# Patient Record
Sex: FEMALE | Race: WHITE | NOT HISPANIC OR LATINO | Employment: OTHER | URBAN - NONMETROPOLITAN AREA
[De-identification: names, ages, dates, MRNs, and addresses within clinical notes are randomized per-mention and may not be internally consistent; named-entity substitution may affect disease eponyms.]

---

## 2023-01-29 PROBLEM — R26.2 DIFFICULTY WALKING: Status: ACTIVE | Noted: 2023-01-29

## 2023-01-29 PROBLEM — N13.30 HYDRONEPHROSIS, RIGHT: Status: ACTIVE | Noted: 2023-01-29

## 2023-01-29 PROBLEM — N20.0 KIDNEY STONE ON RIGHT SIDE: Status: ACTIVE | Noted: 2023-01-29

## 2023-01-29 PROBLEM — N32.9 LESION OF URINARY BLADDER: Status: ACTIVE | Noted: 2023-01-29

## 2023-01-29 PROBLEM — E78.00 HYPERCHOLESTEREMIA: Status: ACTIVE | Noted: 2023-01-29

## 2023-01-29 PROBLEM — R31.9 HEMATURIA: Status: ACTIVE | Noted: 2023-01-29

## 2023-01-29 PROBLEM — R32 URINARY INCONTINENCE: Status: ACTIVE | Noted: 2023-01-29

## 2023-01-29 PROBLEM — R29.898 LEG WEAKNESS: Status: ACTIVE | Noted: 2023-01-29

## 2023-01-29 PROBLEM — I10 HYPERTENSION: Status: ACTIVE | Noted: 2023-01-29

## 2023-01-29 PROBLEM — K60.3 ANAL FISTULA: Status: ACTIVE | Noted: 2023-01-29

## 2023-01-29 PROBLEM — N39.0 UTI (URINARY TRACT INFECTION): Status: ACTIVE | Noted: 2023-01-29

## 2023-01-29 PROBLEM — K60.30 ANAL FISTULA: Status: ACTIVE | Noted: 2023-01-29

## 2023-01-29 RX ORDER — MIRABEGRON 50 MG/1
1 TABLET, EXTENDED RELEASE ORAL DAILY
COMMUNITY
Start: 2021-05-26 | End: 2023-03-13 | Stop reason: ALTCHOICE

## 2023-01-29 RX ORDER — TRAMADOL HYDROCHLORIDE 50 MG/1
1 TABLET ORAL EVERY 6 HOURS PRN
COMMUNITY
Start: 2021-04-19 | End: 2023-03-13 | Stop reason: ALTCHOICE

## 2023-01-29 RX ORDER — METOPROLOL TARTRATE 50 MG/1
TABLET ORAL
COMMUNITY
End: 2023-03-13 | Stop reason: ALTCHOICE

## 2023-01-29 RX ORDER — ARIPIPRAZOLE 10 MG/1
1 TABLET ORAL DAILY
COMMUNITY
Start: 2020-11-18 | End: 2023-03-13 | Stop reason: SDUPTHER

## 2023-01-29 RX ORDER — LITHIUM CARBONATE 450 MG/1
1 TABLET ORAL DAILY
COMMUNITY
Start: 2020-11-18 | End: 2023-03-13 | Stop reason: ALTCHOICE

## 2023-01-29 RX ORDER — AMMONIUM LACTATE 12 G/100G
LOTION TOPICAL
COMMUNITY
End: 2023-03-13 | Stop reason: ALTCHOICE

## 2023-01-29 RX ORDER — ATORVASTATIN CALCIUM 40 MG/1
1 TABLET, FILM COATED ORAL DAILY
COMMUNITY
Start: 2020-11-18 | End: 2023-03-13 | Stop reason: ALTCHOICE

## 2023-03-10 NOTE — PROGRESS NOTES
This is a 71yo female here to establish care:    Chief complain:     Urinary and stool incontinence; just finished abx post surgery; b/l leg pain/weakness- no falls in last 6 months; open sore on butt     Nephrolithiasis, hydronephrosis, h/o urinary inctontinence surgery:    Leg weakness, leg weakness: She is haaving leg weakness and bladder incontinence. She is having pain up and down her legs. She was on diclofenac but it was discontinued after her kidney surgery last week. She had surgery to remove kidney stones. She had surgery for kidney stones. Surgery was with Dr. Rivera at [place]. Leg pain and weakness has been going on about 2 years. She has tried osteobiflex and chiropractor treatment as well as physical therapy. Using the wheelchair periodically for a while now, unable to say how long. She uses a walker at home. Has not tried gabapentin for pain. She did physical therapy a couple weeks ago (Home PT). Not having much pain in her back, her pain is concentrated in her knees and hips. She had an injection in her hip once and it helped for a while ([facility] in [place]). She is in an assisted living facility here in [place]. She has a sore on her buttocks that she wants checked.    Bipolar disorder: She is on abilify and lexapro for years. Feels these are working reasonably well.     HTN: She is on metoprolol,unsure what she is taking this for, states that she has not been diagnosed with HTN.    Review of systems completed and unremarkable other than what is documented in HPI.    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  Medical history:  Medications: abilify, lexapro, metoprolol  SurgHx: 3 kidney surgery, 3 surgeries for anal fistula (last spring) (unsure who did these), hemorrhoid surgery, ear drum replacement, tonsillectomy, cholecystectomy  Fhx: canceron her father's side, dad had lung cancer, several uncles had lung cancer  as well  Allergies: NKDA    Gen: No acute distress. Alert and oriented x3.   HEENT: Normocephalic, atraumatic. PERRLA and EOMI, no conjunctival injection. B/L EAC are clear, TM's viewed are WNL. No rhinorrhea, no oropharyngeal lesions.  Neck: No lymphadenopathy, thyroid WNL.  CV: Regular rate and rhythm. Normal S1/S2.  Resp: CTAB/L. No wheezes or rhonchi appreciated.  Abdomen: Soft. Nontender. Nondistended. Bowel sounds normoactive. No guarding or rigidity.  Derm: Skin is warm and dry. No rashes appreciated or suspicious lesions noted.   Neuro: Cranial nerves intact. Normal gait.  Psych: Appropriate mood and affect. Normal speech and eye contact.   Extremities: No deformities appreciated. No severe edema.  MSK: She can stand but only xport in wheelchair, sacral decubitus ulcer noted    73yo female here to establish care:    #Urinary incontinence  I fear her sx are related to leg weakness, spinal disease  Check xray lumbar spine  She wants to trial a medication although she is established with urology  Rx sent oxybutynin    #Sacral decubitus ulcer  Work on offloading  Rx sent doughnut pillow    #Leg pain  She does have e/o OA but with leg weakness there is concern about spinal disease  Has not had benefit from PT  Cannot tolerate nsaids due to CKD and recent kidney surgery  Trial gabapentin  Referring to ortho for injections  Considering pain mgmt eval    #Bipolar disorder  Doing reasonably well on lexapro and abilify    #HTN  Questionable diagnosis  On metoprolol    #Nephrolithiasis  Doing reasonably well since surgery    HCM:  Declining PNA vaccines but she may consider

## 2023-03-13 ENCOUNTER — OFFICE VISIT (OUTPATIENT)
Dept: PRIMARY CARE | Facility: CLINIC | Age: 73
End: 2023-03-13
Payer: MEDICARE

## 2023-03-13 VITALS
DIASTOLIC BLOOD PRESSURE: 65 MMHG | HEIGHT: 62 IN | SYSTOLIC BLOOD PRESSURE: 102 MMHG | BODY MASS INDEX: 39.87 KG/M2 | HEART RATE: 71 BPM

## 2023-03-13 DIAGNOSIS — M25.552 BILATERAL HIP PAIN: ICD-10-CM

## 2023-03-13 DIAGNOSIS — M25.551 BILATERAL HIP PAIN: ICD-10-CM

## 2023-03-13 DIAGNOSIS — M25.562 CHRONIC PAIN OF BOTH KNEES: ICD-10-CM

## 2023-03-13 DIAGNOSIS — G89.29 CHRONIC PAIN OF BOTH KNEES: ICD-10-CM

## 2023-03-13 DIAGNOSIS — R29.898 LEG WEAKNESS, BILATERAL: ICD-10-CM

## 2023-03-13 DIAGNOSIS — R32 URINARY INCONTINENCE, UNSPECIFIED TYPE: ICD-10-CM

## 2023-03-13 DIAGNOSIS — M25.561 CHRONIC PAIN OF BOTH KNEES: ICD-10-CM

## 2023-03-13 DIAGNOSIS — F31.9 BIPOLAR AFFECTIVE DISORDER, REMISSION STATUS UNSPECIFIED (MULTI): Primary | ICD-10-CM

## 2023-03-13 PROCEDURE — 3074F SYST BP LT 130 MM HG: CPT | Performed by: FAMILY MEDICINE

## 2023-03-13 PROCEDURE — 1036F TOBACCO NON-USER: CPT | Performed by: FAMILY MEDICINE

## 2023-03-13 PROCEDURE — 1159F MED LIST DOCD IN RCRD: CPT | Performed by: FAMILY MEDICINE

## 2023-03-13 PROCEDURE — 3008F BODY MASS INDEX DOCD: CPT | Performed by: FAMILY MEDICINE

## 2023-03-13 PROCEDURE — 99204 OFFICE O/P NEW MOD 45 MIN: CPT | Performed by: FAMILY MEDICINE

## 2023-03-13 PROCEDURE — 3078F DIAST BP <80 MM HG: CPT | Performed by: FAMILY MEDICINE

## 2023-03-13 RX ORDER — GABAPENTIN 300 MG/1
300 CAPSULE ORAL 3 TIMES DAILY
Qty: 90 CAPSULE | Refills: 5 | Status: SHIPPED | OUTPATIENT
Start: 2023-03-13 | End: 2023-04-26

## 2023-03-13 RX ORDER — ESCITALOPRAM OXALATE 10 MG/1
1 TABLET ORAL DAILY
COMMUNITY
End: 2023-03-13 | Stop reason: SDUPTHER

## 2023-03-13 RX ORDER — ARIPIPRAZOLE 10 MG/1
10 TABLET ORAL DAILY
Qty: 90 TABLET | Refills: 3 | Status: SHIPPED | OUTPATIENT
Start: 2023-03-13 | End: 2024-05-15

## 2023-03-13 RX ORDER — METOPROLOL SUCCINATE 50 MG/1
50 TABLET, EXTENDED RELEASE ORAL DAILY
Qty: 90 TABLET | Refills: 3 | Status: SHIPPED | OUTPATIENT
Start: 2023-03-13 | End: 2023-10-31 | Stop reason: WASHOUT

## 2023-03-13 RX ORDER — METOPROLOL SUCCINATE 50 MG/1
1 TABLET, EXTENDED RELEASE ORAL DAILY
COMMUNITY
Start: 2023-02-11 | End: 2023-03-13 | Stop reason: SDUPTHER

## 2023-03-13 RX ORDER — OXYBUTYNIN CHLORIDE 15 MG/1
15 TABLET, EXTENDED RELEASE ORAL DAILY
Qty: 30 TABLET | Refills: 2 | Status: SHIPPED | OUTPATIENT
Start: 2023-03-13 | End: 2023-05-01

## 2023-03-13 RX ORDER — ESCITALOPRAM OXALATE 10 MG/1
10 TABLET ORAL DAILY
Qty: 90 TABLET | Refills: 3 | Status: SHIPPED | OUTPATIENT
Start: 2023-03-13 | End: 2023-06-05

## 2023-03-13 NOTE — PATIENT INSTRUCTIONS
Orthopedics:   Ted Zarate () [phone]  JOSE D Sadler [phone]  Bennie Bourgeois () [phone]  [facility] [phone]

## 2023-04-26 ENCOUNTER — OFFICE VISIT (OUTPATIENT)
Dept: PRIMARY CARE | Facility: CLINIC | Age: 73
End: 2023-04-26
Payer: MEDICARE

## 2023-04-26 VITALS — BODY MASS INDEX: 39.87 KG/M2 | DIASTOLIC BLOOD PRESSURE: 73 MMHG | HEIGHT: 62 IN | SYSTOLIC BLOOD PRESSURE: 116 MMHG

## 2023-04-26 DIAGNOSIS — N13.30 HYDRONEPHROSIS, RIGHT: ICD-10-CM

## 2023-04-26 DIAGNOSIS — R29.898 WEAKNESS OF BOTH LOWER EXTREMITIES: Primary | ICD-10-CM

## 2023-04-26 DIAGNOSIS — R19.7 DIARRHEA, UNSPECIFIED TYPE: ICD-10-CM

## 2023-04-26 PROCEDURE — 3074F SYST BP LT 130 MM HG: CPT | Performed by: REGISTERED NURSE

## 2023-04-26 PROCEDURE — 99213 OFFICE O/P EST LOW 20 MIN: CPT | Performed by: REGISTERED NURSE

## 2023-04-26 PROCEDURE — 1036F TOBACCO NON-USER: CPT | Performed by: REGISTERED NURSE

## 2023-04-26 PROCEDURE — 3008F BODY MASS INDEX DOCD: CPT | Performed by: REGISTERED NURSE

## 2023-04-26 PROCEDURE — 3078F DIAST BP <80 MM HG: CPT | Performed by: REGISTERED NURSE

## 2023-04-26 PROCEDURE — 1160F RVW MEDS BY RX/DR IN RCRD: CPT | Performed by: REGISTERED NURSE

## 2023-04-26 PROCEDURE — 1159F MED LIST DOCD IN RCRD: CPT | Performed by: REGISTERED NURSE

## 2023-04-26 RX ORDER — GABAPENTIN 400 MG/1
400 CAPSULE ORAL 3 TIMES DAILY
Qty: 90 CAPSULE | Refills: 11 | Status: SHIPPED | OUTPATIENT
Start: 2023-04-26 | End: 2024-04-25

## 2023-04-26 RX ORDER — ATORVASTATIN CALCIUM 40 MG/1
1 TABLET, FILM COATED ORAL DAILY
COMMUNITY
End: 2023-10-31 | Stop reason: WASHOUT

## 2023-04-26 RX ORDER — DICLOFENAC SODIUM 75 MG/1
TABLET, DELAYED RELEASE ORAL
COMMUNITY
Start: 2020-11-18 | End: 2023-10-31 | Stop reason: WASHOUT

## 2023-04-26 ASSESSMENT — ENCOUNTER SYMPTOMS
NAUSEA: 0
COUGH: 0
FREQUENCY: 0
WHEEZING: 0
FEVER: 0
DIARRHEA: 0
CONSTIPATION: 0
SHORTNESS OF BREATH: 0
DIZZINESS: 0
CONFUSION: 0
HEADACHES: 0
CHILLS: 0
WOUND: 0
EYE REDNESS: 0
WEAKNESS: 0
RHINORRHEA: 0
DIFFICULTY URINATING: 0
NERVOUS/ANXIOUS: 0
VOMITING: 0
EYE DISCHARGE: 0
ABDOMINAL PAIN: 0

## 2023-04-26 NOTE — PATIENT INSTRUCTIONS
Orthopedics:   Ted Zarate () [phone]  JOSE D Sadler [phone]  Bennie Bourgeois () [phone]  [facility] [phone]    Pain Management:   Bennie Coello and Mckayla Caruso () [phone]  Clinton Chand and Mio Ortega ([facility]) [phone]  Connie Page () [phone]  [facility] [phone]

## 2023-04-26 NOTE — PROGRESS NOTES
Subjective   Patient ID: Noemi Pérez is a 72 y.o. female who presents for Follow-up (Pt presents for a 6 wks follow up; she has had diarrhea for the last 3 days and once last week; she is also having trouble walking, knees feel like they are going to give out on her;).    HPI   Urinary and stool incontinence; just finished abx post surgery; b/l leg pain/weakness- no falls in last 6 months;     Nephrolithiasis, hydronephrosis, h/o urinary inctontinence surgery:     Leg weakness, leg weakness: She is haaving leg weakness and bladder incontinence. She is having pain up and down her legs. She was on diclofenac but it was discontinued after her kidney surgery last week. She had surgery to remove kidney stones. She had surgery for kidney stones. Surgery was with Dr. Rivera at [place]. Leg pain and weakness has been going on about 2 years. She has tried osteobiflex and chiropractor treatment as well as physical therapy. Using the wheelchair periodically for a while now, unable to say how long. She uses a walker at home. Has not tried gabapentin for pain. She did physical therapy a couple weeks ago (Home PT). Not having much pain in her back, her pain is concentrated in her knees and hips. She had an injection in her hip once and it helped for a while ([facility] in [place]). She is in an assisted living facility here in [place]. She has a sore on her buttocks that she wants checked.    Diarrhea: one last week, and then started 3 days ago, denies blood in stool.   Some improvement with Oxybutinin but having diarrhea now.   Declined sore on coccyx currently.   She was cold this morning, denies fever or chills.     Bipolar disorder: She is on abilify and lexapro for years. Feels these are working reasonably well.      HTN: She is on metoprolol,unsure what she is taking this for, states that she has not been diagnosed with HTN.     Review of systems completed and unremarkable other than what is documented in HPI.    Review  "of Systems   Constitutional:  Negative for chills and fever.   HENT:  Negative for congestion, ear pain and rhinorrhea.    Eyes:  Negative for discharge and redness.   Respiratory:  Negative for cough, shortness of breath and wheezing.    Cardiovascular:  Negative for chest pain and leg swelling.   Gastrointestinal:  Negative for abdominal pain, constipation, diarrhea, nausea and vomiting.   Genitourinary:  Negative for difficulty urinating, frequency and urgency.   Musculoskeletal:  Negative for gait problem.   Skin:  Negative for rash and wound.   Neurological:  Negative for dizziness, weakness and headaches.   Psychiatric/Behavioral:  Negative for confusion. The patient is not nervous/anxious.        Objective   /73 (BP Location: Right arm, Patient Position: Sitting, BP Cuff Size: Adult)   Ht 1.575 m (5' 2\")   BMI 39.87 kg/m²     Physical Exam  Vitals reviewed.   Constitutional:       Appearance: Normal appearance.   HENT:      Head: Normocephalic.      Right Ear: Tympanic membrane, ear canal and external ear normal.      Left Ear: Tympanic membrane, ear canal and external ear normal.      Nose: No rhinorrhea.      Mouth/Throat:      Mouth: Mucous membranes are moist.      Pharynx: Oropharynx is clear.   Eyes:      Pupils: Pupils are equal, round, and reactive to light.   Cardiovascular:      Rate and Rhythm: Normal rate and regular rhythm.      Pulses: Normal pulses.   Pulmonary:      Effort: Pulmonary effort is normal.      Breath sounds: Normal breath sounds.   Abdominal:      General: Abdomen is flat. Bowel sounds are normal.      Palpations: Abdomen is soft.   Musculoskeletal:         General: No tenderness. Normal range of motion.      Right lower leg: No edema.      Left lower leg: No edema.      Comments: In wheelchair    Lymphadenopathy:      Cervical: No cervical adenopathy.   Skin:     General: Skin is warm and dry.      Findings: No rash.   Neurological:      Mental Status: She is alert and " oriented to person, place, and time.   Psychiatric:         Mood and Affect: Mood normal.         Behavior: Behavior normal.       Assessment/Plan       #Diarrhea   Can be viral   If it persists use immodium otc      #Urinary incontinence  I fear her sx are related to leg weakness, spinal disease  Check xray lumbar spine  She wants to trial a medication although she is established with urology  Rx sent oxybutynin  Having some dry mouth      #Sacral decubitus ulcer  Work on offloading  Rx sent doughnut pillow     #Leg pain  She does have e/o OA but with leg weakness there is concern about spinal disease  Has not had benefit from PT  Cannot tolerate nsaids due to CKD and recent kidney surgery  Trial gabapentin  Increase to 400mg TID   Will check kidney function at follow up in 6 weeks   Referring to ortho for injections  Considering pain mgmt eval     #Bipolar disorder  Doing reasonably well on lexapro and abilify     #HTN  Questionable diagnosis  On metoprolol     #Nephrolithiasis  Doing reasonably well since surgery     HCM:  Declining PNA vaccines but she may consider

## 2023-05-01 DIAGNOSIS — R32 URINARY INCONTINENCE, UNSPECIFIED TYPE: ICD-10-CM

## 2023-05-01 RX ORDER — OXYBUTYNIN CHLORIDE 15 MG/1
TABLET, EXTENDED RELEASE ORAL
Qty: 30 TABLET | Refills: 1 | Status: SHIPPED | OUTPATIENT
Start: 2023-05-01

## 2023-05-01 RX ORDER — SULFAMETHOXAZOLE AND TRIMETHOPRIM 800; 160 MG/1; MG/1
TABLET ORAL
COMMUNITY
Start: 2023-04-29 | End: 2023-10-31 | Stop reason: WASHOUT

## 2023-05-04 ENCOUNTER — NURSING HOME VISIT (OUTPATIENT)
Dept: POST ACUTE CARE | Facility: EXTERNAL LOCATION | Age: 73
End: 2023-05-04
Payer: MEDICARE

## 2023-05-04 DIAGNOSIS — N32.81 OAB (OVERACTIVE BLADDER): ICD-10-CM

## 2023-05-04 DIAGNOSIS — N30.00 ACUTE CYSTITIS WITHOUT HEMATURIA: Primary | ICD-10-CM

## 2023-05-04 DIAGNOSIS — K59.00 CONSTIPATION, UNSPECIFIED CONSTIPATION TYPE: ICD-10-CM

## 2023-05-04 DIAGNOSIS — G62.9 NEUROPATHY: ICD-10-CM

## 2023-05-04 DIAGNOSIS — R26.2 DIFFICULTY WALKING: ICD-10-CM

## 2023-05-04 DIAGNOSIS — M16.0 OSTEOARTHRITIS OF BOTH HIPS, UNSPECIFIED OSTEOARTHRITIS TYPE: ICD-10-CM

## 2023-05-04 DIAGNOSIS — F32.A DEPRESSION, UNSPECIFIED DEPRESSION TYPE: ICD-10-CM

## 2023-05-04 DIAGNOSIS — N18.9 CHRONIC KIDNEY DISEASE, UNSPECIFIED CKD STAGE: ICD-10-CM

## 2023-05-04 PROCEDURE — 99309 SBSQ NF CARE MODERATE MDM 30: CPT | Performed by: NURSE PRACTITIONER

## 2023-05-04 NOTE — LETTER
Patient: Noemi Pérez  : 1950    Encounter Date: 2023    Patient ID: Noemi Pérez is a 72 y.o. female who is acute skilled care being seen and evaluated for multiple medical problems.  [id number]   1950    /72   Pulse 68   Temp 36.7 °C (98 °F)   Resp 16   Wt 79.8 kg (176 lb)   SpO2 97%   BMI 32.19 kg/m²     Assessment/Plan  Problem List Items Addressed This Visit          Nervous    Neuropathy     Gabapentin 300 mg by mouth TID             Digestive    Constipation     Colace 100 mg by mouth BID  MOM 30 ml by mouth daily as needed             Genitourinary    UTI (urinary tract infection) - Primary     Bactrim completed 2023  Afebrile  Denies dysuria          OAB (overactive bladder)     Oxybutynin ER 15 mg  by mouth daily          Chronic kidney disease     2023 BUN/Creat 16/1.57  2023 repeat BMP and CBC with diff             Musculoskeletal    Osteoarthritis of both hips     Acetaminophen 650 mg by mouth every 4 hrs as needed  Schedule apt with Dr Rueda (Ortho)- client request   H/O left hip steroid injections               Other    Difficulty walking     PT/OT         Depression     Lexapro 10 mg by mouth daily   Aripiprazole 10 mg by mouth daily               HPI: Client admitted at [facility] from 2023- 2023 with the final diagnosis of UTI, Weakness, inability to ambulate, and severe bilateral hip OA. Client was living at the [facility]. Client received Rocephin and discharged to this facility with oral Bactrim. Urine culture results- contaminated. PMH- CKD and HTN. Requesting Dr Rueda for OA follow up. Client denies HA, dizziness, or lightheadedness. Denies CP, SOB, Cough, or increased edema. RA. Denies abdominal pain, N/V, or diarrhea. States no BM x 3 days. Denies dysuria. Denies change in appetite. Denies insomnia. C/O chronic left hip and knee pain.     Review of Systems ROS as described in in HPI     Physical Exam  Constitutional:        Appearance: Normal appearance.      Comments: Resting in bed    HENT:      Head: Normocephalic.      Mouth/Throat:      Mouth: Mucous membranes are moist.   Cardiovascular:      Rate and Rhythm: Normal rate and regular rhythm.   Pulmonary:      Effort: Pulmonary effort is normal.      Breath sounds: Normal breath sounds.      Comments: RA  Abdominal:      General: Bowel sounds are normal.   Genitourinary:     Comments: Denies dysuria   Musculoskeletal:      Cervical back: Neck supple.      Right lower leg: Right lower leg edema: slight.      Left lower leg: Left lower leg edema: Slight.      Comments: OA bilateral hips  PT/OT   Skin:     General: Skin is warm and dry.   Neurological:      Mental Status: She is alert. Mental status is at baseline.   Psychiatric:         Mood and Affect: Mood normal.      Comments: Conversational                    Electronically Signed By: HALEIGH Mcgovern   5/6/23  8:34 AM

## 2023-05-06 VITALS
BODY MASS INDEX: 32.19 KG/M2 | HEART RATE: 68 BPM | SYSTOLIC BLOOD PRESSURE: 138 MMHG | RESPIRATION RATE: 16 BRPM | DIASTOLIC BLOOD PRESSURE: 72 MMHG | OXYGEN SATURATION: 97 % | TEMPERATURE: 98 F | WEIGHT: 176 LBS

## 2023-05-06 PROBLEM — F32.A DEPRESSION: Status: ACTIVE | Noted: 2023-05-06

## 2023-05-06 PROBLEM — N18.9 CHRONIC KIDNEY DISEASE: Status: ACTIVE | Noted: 2023-05-06

## 2023-05-06 PROBLEM — G62.9 NEUROPATHY: Status: ACTIVE | Noted: 2023-05-06

## 2023-05-06 PROBLEM — M16.0 OSTEOARTHRITIS OF BOTH HIPS: Status: ACTIVE | Noted: 2023-05-06

## 2023-05-06 PROBLEM — N32.81 OAB (OVERACTIVE BLADDER): Status: ACTIVE | Noted: 2023-05-06

## 2023-05-06 PROBLEM — K59.00 CONSTIPATION: Status: ACTIVE | Noted: 2023-05-06

## 2023-05-06 NOTE — ASSESSMENT & PLAN NOTE
Acetaminophen 650 mg by mouth every 4 hrs as needed  Schedule apt with Dr Rueda (Ortho)- client request   H/O left hip steroid injections

## 2023-05-06 NOTE — PROGRESS NOTES
Patient ID: Noemi Pérez is a 72 y.o. female who is acute skilled care being seen and evaluated for multiple medical problems.  [id number]   1950    /72   Pulse 68   Temp 36.7 °C (98 °F)   Resp 16   Wt 79.8 kg (176 lb)   SpO2 97%   BMI 32.19 kg/m²     Assessment/Plan  Problem List Items Addressed This Visit          Nervous    Neuropathy     Gabapentin 300 mg by mouth TID             Digestive    Constipation     Colace 100 mg by mouth BID  MOM 30 ml by mouth daily as needed             Genitourinary    UTI (urinary tract infection) - Primary     Bactrim completed 5/2/2023  Afebrile  Denies dysuria          OAB (overactive bladder)     Oxybutynin ER 15 mg  by mouth daily          Chronic kidney disease     4/26/2023 BUN/Creat 16/1.57  5/8/2023 repeat BMP and CBC with diff             Musculoskeletal    Osteoarthritis of both hips     Acetaminophen 650 mg by mouth every 4 hrs as needed  Schedule apt with Dr Rueda (Ortho)- client request   H/O left hip steroid injections               Other    Difficulty walking     PT/OT         Depression     Lexapro 10 mg by mouth daily   Aripiprazole 10 mg by mouth daily               HPI: Client admitted at [facility] from 4/26/2023- 4/29/2023 with the final diagnosis of UTI, Weakness, inability to ambulate, and severe bilateral hip OA. Client was living at the [facility]. Client received Rocephin and discharged to this facility with oral Bactrim. Urine culture results- contaminated. PMH- CKD and HTN. Requesting Dr Rueda for OA follow up. Client denies HA, dizziness, or lightheadedness. Denies CP, SOB, Cough, or increased edema. RA. Denies abdominal pain, N/V, or diarrhea. States no BM x 3 days. Denies dysuria. Denies change in appetite. Denies insomnia. C/O chronic left hip and knee pain.     Review of Systems ROS as described in in HPI     Physical Exam  Constitutional:       Appearance: Normal appearance.      Comments: Resting in bed    HENT:       Head: Normocephalic.      Mouth/Throat:      Mouth: Mucous membranes are moist.   Cardiovascular:      Rate and Rhythm: Normal rate and regular rhythm.   Pulmonary:      Effort: Pulmonary effort is normal.      Breath sounds: Normal breath sounds.      Comments: RA  Abdominal:      General: Bowel sounds are normal.   Genitourinary:     Comments: Denies dysuria   Musculoskeletal:      Cervical back: Neck supple.      Right lower leg: Right lower leg edema: slight.      Left lower leg: Left lower leg edema: Slight.      Comments: OA bilateral hips  PT/OT   Skin:     General: Skin is warm and dry.   Neurological:      Mental Status: She is alert. Mental status is at baseline.   Psychiatric:         Mood and Affect: Mood normal.      Comments: Conversational

## 2023-05-11 ENCOUNTER — NURSING HOME VISIT (OUTPATIENT)
Dept: POST ACUTE CARE | Facility: EXTERNAL LOCATION | Age: 73
End: 2023-05-11
Payer: MEDICARE

## 2023-05-11 DIAGNOSIS — K59.00 CONSTIPATION, UNSPECIFIED CONSTIPATION TYPE: ICD-10-CM

## 2023-05-11 DIAGNOSIS — R53.81 PHYSICAL DECONDITIONING: Primary | ICD-10-CM

## 2023-05-11 DIAGNOSIS — M16.0 OSTEOARTHRITIS OF BOTH HIPS, UNSPECIFIED OSTEOARTHRITIS TYPE: ICD-10-CM

## 2023-05-11 DIAGNOSIS — F32.A DEPRESSION, UNSPECIFIED DEPRESSION TYPE: ICD-10-CM

## 2023-05-11 PROCEDURE — 99308 SBSQ NF CARE LOW MDM 20: CPT | Performed by: NURSE PRACTITIONER

## 2023-05-11 NOTE — LETTER
Patient: Noemi Pérez  : 1950    Encounter Date: 2023    Patient ID: Noemi Pérez is a 72 y.o. female who is acute skilled care being seen and evaluated for multiple medical problems.  [id number]   1950    /74   Pulse 81   Temp 36.6 °C (97.8 °F)   Resp 16   Wt 79.9 kg (176 lb 3.2 oz)   SpO2 96%   BMI 32.23 kg/m²     Assessment/Plan  Problem List Items Addressed This Visit          Digestive    Constipation     Colace 100 mg by mouth BID  MOM 30 ml by mouth daily as needed              Musculoskeletal    Osteoarthritis of both hips     Acetaminophen 650 mg by mouth every 4 hrs as needed  Apt with Dr Rueda (Ortho)- 2023   H/O left hip steroid injections                Other    Depression     Lexapro 10 mg by mouth daily   Aripiprazole 10 mg by mouth daily          Physical deconditioning - Primary     PT              HPI: Client continues to receive PT services. Pending apt with Dr Rueda 2023 for bilateral hip OA and left knee pain. Client denies HA, dizziness, or lightheadedness. Denies CP, SOB, Cough, or increased edema. RA. Denies abdominal pain, N/V, diarrhea, or constipation. Denies dysuria. Denies change in appetite. Denies insomnia.     Review of Systems ROS as described in in HPI     Physical Exam  Constitutional:       Appearance: Normal appearance.      Comments: Sitting in WC   HENT:      Head: Normocephalic.      Mouth/Throat:      Mouth: Mucous membranes are moist.   Cardiovascular:      Rate and Rhythm: Normal rate and regular rhythm.   Pulmonary:      Effort: Pulmonary effort is normal.      Breath sounds: Normal breath sounds.      Comments: RA  Abdominal:      General: Bowel sounds are normal.   Genitourinary:     Comments: Denies dysuria   Musculoskeletal:      Cervical back: Neck supple.      Comments: Bilateral hip and left knee pain  OA  PT   Skin:     General: Skin is warm and dry.   Neurological:      Mental Status: She is alert. Mental status is  at baseline.   Psychiatric:         Mood and Affect: Mood normal.      Comments: Conversational                    Electronically Signed By: HALEIGH Mcgovern   5/14/23  7:08 AM

## 2023-05-14 VITALS
BODY MASS INDEX: 32.23 KG/M2 | SYSTOLIC BLOOD PRESSURE: 125 MMHG | TEMPERATURE: 97.8 F | WEIGHT: 176.2 LBS | OXYGEN SATURATION: 96 % | HEART RATE: 81 BPM | RESPIRATION RATE: 16 BRPM | DIASTOLIC BLOOD PRESSURE: 74 MMHG

## 2023-05-14 PROBLEM — R53.81 PHYSICAL DECONDITIONING: Status: ACTIVE | Noted: 2023-05-14

## 2023-05-14 NOTE — PROGRESS NOTES
Patient ID: Noemi Pérez is a 72 y.o. female who is acute skilled care being seen and evaluated for multiple medical problems.  [id number]   1950    /74   Pulse 81   Temp 36.6 °C (97.8 °F)   Resp 16   Wt 79.9 kg (176 lb 3.2 oz)   SpO2 96%   BMI 32.23 kg/m²     Assessment/Plan  Problem List Items Addressed This Visit          Digestive    Constipation     Colace 100 mg by mouth BID  MOM 30 ml by mouth daily as needed              Musculoskeletal    Osteoarthritis of both hips     Acetaminophen 650 mg by mouth every 4 hrs as needed  Apt with Dr Rueda (Ortho)- 5/23/2023   H/O left hip steroid injections                Other    Depression     Lexapro 10 mg by mouth daily   Aripiprazole 10 mg by mouth daily          Physical deconditioning - Primary     PT              HPI: Client continues to receive PT services. Pending apt with Dr Rueda 5/23/2023 for bilateral hip OA and left knee pain. Client denies HA, dizziness, or lightheadedness. Denies CP, SOB, Cough, or increased edema. RA. Denies abdominal pain, N/V, diarrhea, or constipation. Denies dysuria. Denies change in appetite. Denies insomnia.     Review of Systems ROS as described in in HPI     Physical Exam  Constitutional:       Appearance: Normal appearance.      Comments: Sitting in WC   HENT:      Head: Normocephalic.      Mouth/Throat:      Mouth: Mucous membranes are moist.   Cardiovascular:      Rate and Rhythm: Normal rate and regular rhythm.   Pulmonary:      Effort: Pulmonary effort is normal.      Breath sounds: Normal breath sounds.      Comments: RA  Abdominal:      General: Bowel sounds are normal.   Genitourinary:     Comments: Denies dysuria   Musculoskeletal:      Cervical back: Neck supple.      Comments: Bilateral hip and left knee pain  OA  PT   Skin:     General: Skin is warm and dry.   Neurological:      Mental Status: She is alert. Mental status is at baseline.   Psychiatric:         Mood and Affect: Mood normal.       Comments: Conversational

## 2023-05-14 NOTE — ASSESSMENT & PLAN NOTE
Acetaminophen 650 mg by mouth every 4 hrs as needed  Apt with Dr Rueda (Ortho)- 5/23/2023   H/O left hip steroid injections

## 2023-05-18 ENCOUNTER — NURSING HOME VISIT (OUTPATIENT)
Dept: POST ACUTE CARE | Facility: EXTERNAL LOCATION | Age: 73
End: 2023-05-18
Payer: MEDICARE

## 2023-05-18 DIAGNOSIS — G62.9 NEUROPATHY: ICD-10-CM

## 2023-05-18 DIAGNOSIS — M16.0 OSTEOARTHRITIS OF BOTH HIPS, UNSPECIFIED OSTEOARTHRITIS TYPE: ICD-10-CM

## 2023-05-18 DIAGNOSIS — R53.81 PHYSICAL DECONDITIONING: Primary | ICD-10-CM

## 2023-05-18 DIAGNOSIS — K59.00 CONSTIPATION, UNSPECIFIED CONSTIPATION TYPE: ICD-10-CM

## 2023-05-18 PROCEDURE — 99308 SBSQ NF CARE LOW MDM 20: CPT | Performed by: NURSE PRACTITIONER

## 2023-05-18 NOTE — LETTER
Patient: Noemi Pérez  : 1950    Encounter Date: 2023    Patient ID: Noemi Pérez is a 72 y.o. female who is acute skilled care being seen and evaluated for multiple medical problems.  [id number]   1950    /72   Pulse 74   Temp 36.6 °C (97.8 °F)   Resp 17   Wt 79.8 kg (176 lb)   SpO2 95%   BMI 32.19 kg/m²     Assessment/Plan  Problem List Items Addressed This Visit          Nervous    Neuropathy     Gabapentin 300 mg by mouth TID             Digestive    Constipation     Colace 100 mg by mouth BID  MOM 30 ml by mouth daily as needed                 Musculoskeletal    Osteoarthritis of both hips     Acetaminophen 650 mg by mouth every 4 hrs as needed  Apt with Dr Rueda (Ortho)- 2023   H/O left hip steroid injections            Other    Physical deconditioning - Primary     PT              HPI: Client continues to receive PT services. Client denies HA, dizziness, or lightheadedness. Denies CP, SOB, Cough, or increased edema. Denies abdominal pain, N/V, diarrhea, or constipation. Denies dysuria or hematuria. Denies change in appetite. Denies insomnia. Denies current pain.  C/O Bilateral hip and left knee pain when up in WC.     Review of Systems ROS as described in in HPI     Physical Exam  Constitutional:       Appearance: Normal appearance.      Comments: Resting in bed    HENT:      Head: Normocephalic.      Mouth/Throat:      Mouth: Mucous membranes are moist.   Cardiovascular:      Rate and Rhythm: Normal rate and regular rhythm.   Pulmonary:      Effort: Pulmonary effort is normal.      Breath sounds: Normal breath sounds.      Comments: RA  Abdominal:      General: Bowel sounds are normal.      Palpations: Abdomen is soft.   Musculoskeletal:      Cervical back: Neck supple.      Comments: PT/OT  Bilateral hip and left knee pain    Skin:     General: Skin is warm and dry.   Neurological:      Mental Status: She is alert. Mental status is at baseline.   Psychiatric:          Mood and Affect: Mood normal.                   Electronically Signed By: HALEIGH Mcgovern   5/19/23  4:10 PM

## 2023-05-19 VITALS
TEMPERATURE: 97.8 F | OXYGEN SATURATION: 95 % | BODY MASS INDEX: 32.19 KG/M2 | HEART RATE: 74 BPM | DIASTOLIC BLOOD PRESSURE: 72 MMHG | SYSTOLIC BLOOD PRESSURE: 119 MMHG | RESPIRATION RATE: 17 BRPM | WEIGHT: 176 LBS

## 2023-05-19 NOTE — PROGRESS NOTES
Patient ID: Noemi Pérez is a 72 y.o. female who is acute skilled care being seen and evaluated for multiple medical problems.  [id number]   1950    /72   Pulse 74   Temp 36.6 °C (97.8 °F)   Resp 17   Wt 79.8 kg (176 lb)   SpO2 95%   BMI 32.19 kg/m²     Assessment/Plan  Problem List Items Addressed This Visit          Nervous    Neuropathy     Gabapentin 300 mg by mouth TID             Digestive    Constipation     Colace 100 mg by mouth BID  MOM 30 ml by mouth daily as needed                 Musculoskeletal    Osteoarthritis of both hips     Acetaminophen 650 mg by mouth every 4 hrs as needed  Apt with Dr Rueda (Ortho)- 5/23/2023   H/O left hip steroid injections            Other    Physical deconditioning - Primary     PT              HPI: Client continues to receive PT services. Client denies HA, dizziness, or lightheadedness. Denies CP, SOB, Cough, or increased edema. Denies abdominal pain, N/V, diarrhea, or constipation. Denies dysuria or hematuria. Denies change in appetite. Denies insomnia. Denies current pain.  C/O Bilateral hip and left knee pain when up in WC.     Review of Systems ROS as described in in HPI     Physical Exam  Constitutional:       Appearance: Normal appearance.      Comments: Resting in bed    HENT:      Head: Normocephalic.      Mouth/Throat:      Mouth: Mucous membranes are moist.   Cardiovascular:      Rate and Rhythm: Normal rate and regular rhythm.   Pulmonary:      Effort: Pulmonary effort is normal.      Breath sounds: Normal breath sounds.      Comments: RA  Abdominal:      General: Bowel sounds are normal.      Palpations: Abdomen is soft.   Musculoskeletal:      Cervical back: Neck supple.      Comments: PT/OT  Bilateral hip and left knee pain    Skin:     General: Skin is warm and dry.   Neurological:      Mental Status: She is alert. Mental status is at baseline.   Psychiatric:         Mood and Affect: Mood normal.

## 2023-05-25 ENCOUNTER — NURSING HOME VISIT (OUTPATIENT)
Dept: POST ACUTE CARE | Facility: EXTERNAL LOCATION | Age: 73
End: 2023-05-25
Payer: MEDICARE

## 2023-05-25 DIAGNOSIS — G62.9 NEUROPATHY: ICD-10-CM

## 2023-05-25 DIAGNOSIS — N18.9 CHRONIC KIDNEY DISEASE, UNSPECIFIED CKD STAGE: ICD-10-CM

## 2023-05-25 DIAGNOSIS — M16.0 OSTEOARTHRITIS OF BOTH HIPS, UNSPECIFIED OSTEOARTHRITIS TYPE: ICD-10-CM

## 2023-05-25 DIAGNOSIS — R53.81 PHYSICAL DECONDITIONING: Primary | ICD-10-CM

## 2023-05-25 PROCEDURE — 99308 SBSQ NF CARE LOW MDM 20: CPT | Performed by: NURSE PRACTITIONER

## 2023-05-25 NOTE — LETTER
Patient: Noemi Pérez  : 1950    Encounter Date: 2023    Patient ID: Noemi Pérez is a 72 y.o. female who is acute skilled care being seen and evaluated for multiple medical problems.  [id number]   1950    /67   Pulse 72   Temp 36.1 °C (97 °F)   Resp 16   Wt 79.8 kg (176 lb)   SpO2 97%   BMI 32.19 kg/m²     Assessment/Plan  Problem List Items Addressed This Visit          Nervous    Neuropathy     Gabapentin 300 mg by mouth TID              Genitourinary    Chronic kidney disease     2023 BUN/Creat 16/1.57             Musculoskeletal    Osteoarthritis of both hips     Acetaminophen 650 mg by mouth every 4 hrs as needed  Apt with Dr Rueda (Ortho)- 2023 completed   Tentative Left Total Hip Arthroplasty 2023  Medical Clearance- CMP, CBC with diff, EKG              Other    Physical deconditioning - Primary     PT              HPI: Client pending left total hip arthroplasty per Dr Rueda 2023. Client denies HA, dizziness, or lightheadedness. Denies CP, SOB, Cough, or increased edema. RA. Denies abdominal pain, N/V, diarrhea, or constipation. Denies dysuria. Denies change in appetite. C/O chronic left hip and left knee pain.       Review of Systems ROS as described in in HPI     Physical Exam  Constitutional:       Appearance: Normal appearance.      Comments: Resting in bed    HENT:      Head: Normocephalic.      Mouth/Throat:      Mouth: Mucous membranes are moist.   Cardiovascular:      Rate and Rhythm: Normal rate and regular rhythm.   Pulmonary:      Effort: Pulmonary effort is normal.      Breath sounds: Normal breath sounds.      Comments: RA  Abdominal:      General: Bowel sounds are normal.      Palpations: Abdomen is soft.   Genitourinary:     Comments: Denies dysuria   Musculoskeletal:      Cervical back: Neck supple.      Comments: Chronic left hip and knee pain    Skin:     General: Skin is warm and dry.   Neurological:      Mental Status: She is  alert and oriented to person, place, and time. Mental status is at baseline.   Psychiatric:         Mood and Affect: Mood normal.                   Electronically Signed By: HALEIGH Mcgovern   5/28/23  7:52 AM

## 2023-05-28 VITALS
DIASTOLIC BLOOD PRESSURE: 67 MMHG | BODY MASS INDEX: 32.19 KG/M2 | WEIGHT: 176 LBS | RESPIRATION RATE: 16 BRPM | HEART RATE: 72 BPM | OXYGEN SATURATION: 97 % | TEMPERATURE: 97 F | SYSTOLIC BLOOD PRESSURE: 123 MMHG

## 2023-05-28 PROBLEM — M16.0 BILATERAL HIP JOINT ARTHRITIS: Status: ACTIVE | Noted: 2023-05-28

## 2023-05-28 NOTE — PROGRESS NOTES
Patient ID: Noemi Pérez is a 72 y.o. female who is acute skilled care being seen and evaluated for multiple medical problems.  [id number]   1950    /67   Pulse 72   Temp 36.1 °C (97 °F)   Resp 16   Wt 79.8 kg (176 lb)   SpO2 97%   BMI 32.19 kg/m²     Assessment/Plan  Problem List Items Addressed This Visit          Nervous    Neuropathy     Gabapentin 300 mg by mouth TID              Genitourinary    Chronic kidney disease     4/26/2023 BUN/Creat 16/1.57             Musculoskeletal    Osteoarthritis of both hips     Acetaminophen 650 mg by mouth every 4 hrs as needed  Apt with Dr Rueda (Ortho)- 5/23/2023 completed   Tentative Left Total Hip Arthroplasty 7/31/2023  Medical Clearance- CMP, CBC with diff, EKG              Other    Physical deconditioning - Primary     PT              HPI: Client pending left total hip arthroplasty per Dr Rueda 7/31/2023. Client denies HA, dizziness, or lightheadedness. Denies CP, SOB, Cough, or increased edema. RA. Denies abdominal pain, N/V, diarrhea, or constipation. Denies dysuria. Denies change in appetite. C/O chronic left hip and left knee pain.       Review of Systems ROS as described in in HPI     Physical Exam  Constitutional:       Appearance: Normal appearance.      Comments: Resting in bed    HENT:      Head: Normocephalic.      Mouth/Throat:      Mouth: Mucous membranes are moist.   Cardiovascular:      Rate and Rhythm: Normal rate and regular rhythm.   Pulmonary:      Effort: Pulmonary effort is normal.      Breath sounds: Normal breath sounds.      Comments: RA  Abdominal:      General: Bowel sounds are normal.      Palpations: Abdomen is soft.   Genitourinary:     Comments: Denies dysuria   Musculoskeletal:      Cervical back: Neck supple.      Comments: Chronic left hip and knee pain    Skin:     General: Skin is warm and dry.   Neurological:      Mental Status: She is alert and oriented to person, place, and time. Mental status is at baseline.    Psychiatric:         Mood and Affect: Mood normal.

## 2023-05-28 NOTE — ASSESSMENT & PLAN NOTE
Acetaminophen 650 mg by mouth every 4 hrs as needed  Apt with Dr Rueda (Ortho)- 5/23/2023 completed   Tentative Left Total Hip Arthroplasty 7/31/2023  Medical Clearance- CMP, CBC with diff, EKG

## 2023-06-01 ENCOUNTER — NURSING HOME VISIT (OUTPATIENT)
Dept: POST ACUTE CARE | Facility: EXTERNAL LOCATION | Age: 73
End: 2023-06-01
Payer: MEDICARE

## 2023-06-01 DIAGNOSIS — E78.5 HYPERLIPIDEMIA, UNSPECIFIED HYPERLIPIDEMIA TYPE: ICD-10-CM

## 2023-06-01 DIAGNOSIS — D64.9 ANEMIA, UNSPECIFIED TYPE: ICD-10-CM

## 2023-06-01 DIAGNOSIS — G62.9 NEUROPATHY: Primary | ICD-10-CM

## 2023-06-01 DIAGNOSIS — M16.0 OSTEOARTHRITIS OF BOTH HIPS, UNSPECIFIED OSTEOARTHRITIS TYPE: ICD-10-CM

## 2023-06-01 PROCEDURE — 99308 SBSQ NF CARE LOW MDM 20: CPT | Performed by: NURSE PRACTITIONER

## 2023-06-01 NOTE — LETTER
Patient: Noemi Pérez  : 1950    Encounter Date: 2023    Patient ID: Noemi Pérez is a 72 y.o. female who is long term resident being seen and evaluated for multiple medical problems.  [id number]   1950    /76   Pulse 70   Temp 36.6 °C (97.8 °F)   Resp 16   Wt 80.7 kg (178 lb)   SpO2 96%   BMI 32.56 kg/m²     Assessment/Plan  Problem List Items Addressed This Visit          Nervous    Neuropathy - Primary     Gabapentin 300 mg by mouth TID               Musculoskeletal    Osteoarthritis of both hips     Acetaminophen 650 mg by mouth every 4 hrs as needed  Apt with Dr Rueda (Ortho)- 2023 completed   Tentative Left Total Hip Arthroplasty 2023  Medical Clearance- CMP, CBC with diff, EKG completed   H/H 9..  BUN/CREAT .2            Hematologic    Anemia     H/H 9..  Iron, TIBC, Folate, B12 x 1            Other    Hyperlipidemia     2023 C 222, T 215, HDL 23,   Add Pravastatin 10 mg by mouth every HS               HPI: Pravastatin for HLD. Anemia panel pending, Hgb 9.8. Client denies HA, dizziness, or lightheadedness. Denies CP, SOB, Cough, or increased edema. Denies abdominal pain, N/V, diarrhea, or constipation. Denies dysuria. Denies change in appetite. Denies insomnia. C/O chronic left hip and knee pain.     Review of Systems ROS as described in in HPI     Physical Exam  Constitutional:       Appearance: Normal appearance.      Comments: Sitting in WC   HENT:      Head: Normocephalic.      Mouth/Throat:      Mouth: Mucous membranes are moist.   Cardiovascular:      Rate and Rhythm: Normal rate and regular rhythm.      Comments: EKG- NSR  Pulmonary:      Effort: Pulmonary effort is normal.      Breath sounds: Normal breath sounds.      Comments: RA  Abdominal:      General: Bowel sounds are normal.   Genitourinary:     Comments: Denies dysuria   Musculoskeletal:      Cervical back: Neck supple.      Comments: Chronic left hip and knee pain  WC     Skin:     General: Skin is warm and dry.   Neurological:      Mental Status: She is alert. Mental status is at baseline.   Psychiatric:         Mood and Affect: Mood normal.                   Electronically Signed By: HALEIGH Mcgovern   6/3/23  6:52 AM

## 2023-06-03 VITALS
SYSTOLIC BLOOD PRESSURE: 127 MMHG | WEIGHT: 178 LBS | DIASTOLIC BLOOD PRESSURE: 76 MMHG | HEART RATE: 70 BPM | RESPIRATION RATE: 16 BRPM | OXYGEN SATURATION: 96 % | TEMPERATURE: 97.8 F | BODY MASS INDEX: 32.56 KG/M2

## 2023-06-03 PROBLEM — D64.9 ANEMIA: Status: ACTIVE | Noted: 2023-06-03

## 2023-06-03 PROBLEM — E78.5 HYPERLIPIDEMIA: Status: ACTIVE | Noted: 2023-01-29

## 2023-06-03 NOTE — PROGRESS NOTES
Patient ID: Noemi Pérez is a 72 y.o. female who is long term resident being seen and evaluated for multiple medical problems.  [id number]   1950    /76   Pulse 70   Temp 36.6 °C (97.8 °F)   Resp 16   Wt 80.7 kg (178 lb)   SpO2 96%   BMI 32.56 kg/m²     Assessment/Plan  Problem List Items Addressed This Visit          Nervous    Neuropathy - Primary     Gabapentin 300 mg by mouth TID               Musculoskeletal    Osteoarthritis of both hips     Acetaminophen 650 mg by mouth every 4 hrs as needed  Apt with Dr Rueda (Ortho)- 5/23/2023 completed   Tentative Left Total Hip Arthroplasty 7/31/2023  Medical Clearance- CMP, CBC with diff, EKG completed   H/H 9.8/31.5  BUN/CREAT 20/1.2            Hematologic    Anemia     H/H 9.8/31.5  Iron, TIBC, Folate, B12 x 1            Other    Hyperlipidemia     5/1/2023 C 222, T 215, HDL 23,   Add Pravastatin 10 mg by mouth every HS               HPI: Pravastatin for HLD. Anemia panel pending, Hgb 9.8. Client denies HA, dizziness, or lightheadedness. Denies CP, SOB, Cough, or increased edema. Denies abdominal pain, N/V, diarrhea, or constipation. Denies dysuria. Denies change in appetite. Denies insomnia. C/O chronic left hip and knee pain.     Review of Systems ROS as described in in HPI     Physical Exam  Constitutional:       Appearance: Normal appearance.      Comments: Sitting in WC   HENT:      Head: Normocephalic.      Mouth/Throat:      Mouth: Mucous membranes are moist.   Cardiovascular:      Rate and Rhythm: Normal rate and regular rhythm.      Comments: EKG- NSR  Pulmonary:      Effort: Pulmonary effort is normal.      Breath sounds: Normal breath sounds.      Comments: RA  Abdominal:      General: Bowel sounds are normal.   Genitourinary:     Comments: Denies dysuria   Musculoskeletal:      Cervical back: Neck supple.      Comments: Chronic left hip and knee pain      Skin:     General: Skin is warm and dry.   Neurological:      Mental  Status: She is alert. Mental status is at baseline.   Psychiatric:         Mood and Affect: Mood normal.

## 2023-06-03 NOTE — ASSESSMENT & PLAN NOTE
Acetaminophen 650 mg by mouth every 4 hrs as needed  Apt with Dr Rueda (Ortho)- 5/23/2023 completed   Tentative Left Total Hip Arthroplasty 7/31/2023  Medical Clearance- CMP, CBC with diff, EKG completed   H/H 9.8/31.5  BUN/CREAT 20/1.2

## 2023-06-05 DIAGNOSIS — F31.9 BIPOLAR AFFECTIVE DISORDER, REMISSION STATUS UNSPECIFIED (MULTI): ICD-10-CM

## 2023-06-05 RX ORDER — NYSTATIN 100000 [USP'U]/G
POWDER TOPICAL
COMMUNITY
Start: 2023-05-24 | End: 2023-10-31 | Stop reason: WASHOUT

## 2023-06-05 RX ORDER — ESCITALOPRAM OXALATE 10 MG/1
TABLET ORAL
Qty: 30 TABLET | Refills: 2 | Status: SHIPPED | OUTPATIENT
Start: 2023-06-05

## 2023-06-05 RX ORDER — PRAVASTATIN SODIUM 10 MG/1
TABLET ORAL
COMMUNITY
Start: 2023-06-01

## 2023-06-08 ENCOUNTER — NURSING HOME VISIT (OUTPATIENT)
Dept: POST ACUTE CARE | Facility: EXTERNAL LOCATION | Age: 73
End: 2023-06-08
Payer: MEDICARE

## 2023-06-08 DIAGNOSIS — R53.81 PHYSICAL DECONDITIONING: Primary | ICD-10-CM

## 2023-06-08 DIAGNOSIS — M16.0 OSTEOARTHRITIS OF BOTH HIPS, UNSPECIFIED OSTEOARTHRITIS TYPE: ICD-10-CM

## 2023-06-08 DIAGNOSIS — D64.9 ANEMIA, UNSPECIFIED TYPE: ICD-10-CM

## 2023-06-08 PROCEDURE — 99308 SBSQ NF CARE LOW MDM 20: CPT | Performed by: NURSE PRACTITIONER
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
## 2023-06-10 VITALS
OXYGEN SATURATION: 96 % | WEIGHT: 178 LBS | TEMPERATURE: 97 F | SYSTOLIC BLOOD PRESSURE: 122 MMHG | HEART RATE: 74 BPM | DIASTOLIC BLOOD PRESSURE: 72 MMHG | RESPIRATION RATE: 16 BRPM | BODY MASS INDEX: 32.56 KG/M2

## 2023-06-10 NOTE — PROGRESS NOTES
Patient ID: Noemi Pérez is a 72 y.o. female who is acute skilled care being seen and evaluated for multiple medical problems.  [id number]   1950    /72   Pulse 74   Temp 36.1 °C (97 °F)   Resp 16   Wt 80.7 kg (178 lb)   SpO2 96%   BMI 32.56 kg/m²     Assessment/Plan  Problem List Items Addressed This Visit          Musculoskeletal    Osteoarthritis of both hips     Acetaminophen 650 mg by mouth every 4 hrs as needed  Apt with Dr Rueda (Ortho)- 5/23/2023 completed   Tentative Left Total Hip Arthroplasty 7/31/2023  Medical Clearance- CMP, CBC with diff, EKG completed   H/H 9.8/31.5  BUN/CREAT 20/1.2            Hematologic    Anemia     6/2/2023   Iron 29- Ferrous sulfate 325 mg by mouth daily   B12 166- B12 1000 mcg IM monthly             Other    Physical deconditioning - Primary     PT              HPI: Client continues to receive PT services, physical deconditioning. Client denies HA, dizziness, or lightheadedness. Denies CP, SOB, Cough, or increased edema. RA. Denies abdominal pain, N/V, diarrhea, or constipation. Denies dysuria. Denies change in appetite. Denies insomnia. C/O chronic bilateral hip and left knee pain.      Review of Systems ROS as described in in HPI     Physical Exam  Constitutional:       Appearance: Normal appearance.      Comments: Sitting in WC   HENT:      Head: Normocephalic.      Mouth/Throat:      Mouth: Mucous membranes are moist.   Cardiovascular:      Rate and Rhythm: Normal rate and regular rhythm.   Pulmonary:      Effort: Pulmonary effort is normal.      Breath sounds: Normal breath sounds.      Comments: RA  Abdominal:      General: Bowel sounds are normal.   Genitourinary:     Comments: Denies dysuria   Musculoskeletal:      Cervical back: Neck supple.      Comments: PT  Chronic bilateral hip and left knee pain    Skin:     General: Skin is warm and dry.   Neurological:      Mental Status: She is alert and oriented to person, place, and time.   Psychiatric:          Mood and Affect: Mood normal.

## 2023-06-12 ENCOUNTER — APPOINTMENT (OUTPATIENT)
Dept: PRIMARY CARE | Facility: CLINIC | Age: 73
End: 2023-06-12
Payer: MEDICARE

## 2023-06-15 ENCOUNTER — NURSING HOME VISIT (OUTPATIENT)
Dept: POST ACUTE CARE | Facility: EXTERNAL LOCATION | Age: 73
End: 2023-06-15
Payer: MEDICARE

## 2023-06-15 DIAGNOSIS — I82.4Z1 ACUTE DEEP VEIN THROMBOSIS (DVT) OF DISTAL VEIN OF RIGHT LOWER EXTREMITY (MULTI): Primary | ICD-10-CM

## 2023-06-15 DIAGNOSIS — K59.00 CONSTIPATION, UNSPECIFIED CONSTIPATION TYPE: ICD-10-CM

## 2023-06-15 DIAGNOSIS — R11.2 NAUSEA AND VOMITING, UNSPECIFIED VOMITING TYPE: ICD-10-CM

## 2023-06-15 PROCEDURE — 99308 SBSQ NF CARE LOW MDM 20: CPT | Performed by: NURSE PRACTITIONER

## 2023-06-15 NOTE — LETTER
Patient: Noemi Pérez  : 1950    Encounter Date: 06/15/2023    Patient ID: Noemi Pérez is a 72 y.o. female who is acute skilled care being seen and evaluated for multiple medical problems.  [id number]   1950    /78   Pulse 74   Temp 36.1 °C (97 °F)   Resp 15   Wt 80.7 kg (178 lb)   SpO2 98%   BMI 32.56 kg/m²     Assessment/Plan  Problem List Items Addressed This Visit          Circulatory    Acute deep vein thrombosis (DVT) of distal vein of right lower extremity (CMS/HCC) - Primary     Apixaban 5 mg by mouth BID   Dr Quinteros (Vascular) apt 7/3/2023    RLE- Right CFV, proximal- distal FV, Popliteal vein             Digestive    Constipation     Colace 100 mg by mouth BID  MiraLax 17 grams by mouth daily   MOM 30 ml by mouth daily as needed            Nausea and vomiting     Resolved   KUB- large stool burden               HPI: Client receiving Apixaban for RLE DVT. Pending vascular follow up pat with Dr Quinteros. No further C/O N/V. Client had BM today. Client denies HA, dizziness, or lightheadedness. Denies CP, SOB, or Cough. RA. Denies current abdominal pain, current N/V, diarrhea, or current constipation. Denies dysuria. States appetite improving. Denies insomnia. C/O chronic Bilateral hip and left knee pain.     Review of Systems ROS as described in in HPI     Physical Exam  Constitutional:       Appearance: Normal appearance.      Comments: Sitting in WC   HENT:      Head: Normocephalic.      Mouth/Throat:      Mouth: Mucous membranes are moist.   Cardiovascular:      Rate and Rhythm: Normal rate and regular rhythm.   Pulmonary:      Effort: Pulmonary effort is normal.      Breath sounds: Normal breath sounds.      Comments: RA  Abdominal:      General: Bowel sounds are normal.      Palpations: Abdomen is soft.   Genitourinary:     Comments: Denies dysuria   Musculoskeletal:      Cervical back: Neck supple.      Right lower leg: Edema (plus two) present.      Left lower leg: Edema  (slight) present.      Comments: WC  Chronic bilateral hip and left knee pain    Skin:     General: Skin is warm and dry.   Neurological:      Mental Status: She is alert. Mental status is at baseline.   Psychiatric:         Mood and Affect: Mood normal.      Comments: Conversational                  Electronically Signed By: HALEIGH Mcgovern   6/18/23  3:35 PM

## 2023-06-18 VITALS
SYSTOLIC BLOOD PRESSURE: 120 MMHG | TEMPERATURE: 97 F | WEIGHT: 178 LBS | BODY MASS INDEX: 32.56 KG/M2 | OXYGEN SATURATION: 98 % | DIASTOLIC BLOOD PRESSURE: 78 MMHG | RESPIRATION RATE: 15 BRPM | HEART RATE: 74 BPM

## 2023-06-18 PROBLEM — R11.2 NAUSEA AND VOMITING: Status: ACTIVE | Noted: 2023-06-18

## 2023-06-18 PROBLEM — I82.4Z1 ACUTE DEEP VEIN THROMBOSIS (DVT) OF DISTAL VEIN OF RIGHT LOWER EXTREMITY (MULTI): Status: ACTIVE | Noted: 2023-06-18

## 2023-06-18 NOTE — PROGRESS NOTES
Patient ID: Noemi Pérez is a 72 y.o. female who is acute skilled care being seen and evaluated for multiple medical problems.  [id number]   1950    /78   Pulse 74   Temp 36.1 °C (97 °F)   Resp 15   Wt 80.7 kg (178 lb)   SpO2 98%   BMI 32.56 kg/m²     Assessment/Plan  Problem List Items Addressed This Visit          Circulatory    Acute deep vein thrombosis (DVT) of distal vein of right lower extremity (CMS/HCC) - Primary     Apixaban 5 mg by mouth BID   Dr Quinteros (Vascular) apt 7/3/2023    RLE- Right CFV, proximal- distal FV, Popliteal vein             Digestive    Constipation     Colace 100 mg by mouth BID  MiraLax 17 grams by mouth daily   MOM 30 ml by mouth daily as needed            Nausea and vomiting     Resolved   KUB- large stool burden               HPI: Client receiving Apixaban for RLE DVT. Pending vascular follow up pat with Dr Quinteros. No further C/O N/V. Client had BM today. Client denies HA, dizziness, or lightheadedness. Denies CP, SOB, or Cough. RA. Denies current abdominal pain, current N/V, diarrhea, or current constipation. Denies dysuria. States appetite improving. Denies insomnia. C/O chronic Bilateral hip and left knee pain.     Review of Systems ROS as described in in HPI     Physical Exam  Constitutional:       Appearance: Normal appearance.      Comments: Sitting in WC   HENT:      Head: Normocephalic.      Mouth/Throat:      Mouth: Mucous membranes are moist.   Cardiovascular:      Rate and Rhythm: Normal rate and regular rhythm.   Pulmonary:      Effort: Pulmonary effort is normal.      Breath sounds: Normal breath sounds.      Comments: RA  Abdominal:      General: Bowel sounds are normal.      Palpations: Abdomen is soft.   Genitourinary:     Comments: Denies dysuria   Musculoskeletal:      Cervical back: Neck supple.      Right lower leg: Edema (plus two) present.      Left lower leg: Edema (slight) present.      Comments: WC  Chronic bilateral hip and left knee pain     Skin:     General: Skin is warm and dry.   Neurological:      Mental Status: She is alert. Mental status is at baseline.   Psychiatric:         Mood and Affect: Mood normal.      Comments: Conversational

## 2023-06-18 NOTE — ASSESSMENT & PLAN NOTE
Apixaban 5 mg by mouth BID   Dr Quinteros (Vascular) apt 7/3/2023   US RLE- Right CFV, proximal- distal FV, Popliteal vein

## 2023-06-18 NOTE — ASSESSMENT & PLAN NOTE
Colace 100 mg by mouth BID  MiraLax 17 grams by mouth daily   MOM 30 ml by mouth daily as needed

## 2023-06-29 ENCOUNTER — NURSING HOME VISIT (OUTPATIENT)
Dept: POST ACUTE CARE | Facility: EXTERNAL LOCATION | Age: 73
End: 2023-06-29
Payer: MEDICARE

## 2023-06-29 VITALS
WEIGHT: 178 LBS | SYSTOLIC BLOOD PRESSURE: 130 MMHG | DIASTOLIC BLOOD PRESSURE: 82 MMHG | RESPIRATION RATE: 15 BRPM | OXYGEN SATURATION: 98 % | HEART RATE: 75 BPM | BODY MASS INDEX: 32.56 KG/M2 | TEMPERATURE: 97.6 F

## 2023-06-29 DIAGNOSIS — M25.552 BILATERAL HIP PAIN: ICD-10-CM

## 2023-06-29 DIAGNOSIS — I82.4Z1 ACUTE DEEP VEIN THROMBOSIS (DVT) OF DISTAL VEIN OF RIGHT LOWER EXTREMITY (MULTI): Primary | ICD-10-CM

## 2023-06-29 DIAGNOSIS — M25.551 BILATERAL HIP PAIN: ICD-10-CM

## 2023-06-29 PROBLEM — R11.2 NAUSEA AND VOMITING: Status: RESOLVED | Noted: 2023-06-18 | Resolved: 2023-06-29

## 2023-06-29 PROBLEM — N32.9 LESION OF URINARY BLADDER: Status: RESOLVED | Noted: 2023-01-29 | Resolved: 2023-06-29

## 2023-06-29 PROBLEM — R31.9 HEMATURIA: Status: RESOLVED | Noted: 2023-01-29 | Resolved: 2023-06-29

## 2023-06-29 PROBLEM — N39.0 UTI (URINARY TRACT INFECTION): Status: RESOLVED | Noted: 2023-01-29 | Resolved: 2023-06-29

## 2023-06-29 PROBLEM — K60.3 ANAL FISTULA: Status: RESOLVED | Noted: 2023-01-29 | Resolved: 2023-06-29

## 2023-06-29 PROBLEM — K60.30 ANAL FISTULA: Status: RESOLVED | Noted: 2023-01-29 | Resolved: 2023-06-29

## 2023-06-29 PROCEDURE — 99308 SBSQ NF CARE LOW MDM 20: CPT | Performed by: NURSE PRACTITIONER

## 2023-06-29 NOTE — PROGRESS NOTES
Patient ID: Noemi Pérez is a 72 y.o. female who is long term resident being seen and evaluated for multiple medical problems.  [id number]   1950    /82   Pulse 75   Temp 36.4 °C (97.6 °F)   Resp 15   Wt 80.7 kg (178 lb)   SpO2 98%   BMI 32.56 kg/m²     Assessment/Plan  Problem List Items Addressed This Visit          Coag and Thromboembolic    Acute deep vein thrombosis (DVT) of distal vein of right lower extremity (CMS/HCC) - Primary     Apixaban 5 mg by mouth BID   Dr Quinteros (Vascular) apt 7/3/2023 - Send US results with client to apt  US RLE- Right CFV, proximal- distal FV, Popliteal vein            Musculoskeletal and Injuries    Bilateral hip pain     Acetaminophen 650 mg by mouth every 8 hrs as needed  Add Tramadol 50 mg  by mouth every 8 hrs as needed               HPI: Client pending apt with vascular, new RLE DVT. Client C/O pain to bilateral hips, chronic. Client denies HA, dizziness, or lightheadedness. Denies CP, SOB, or Cough,. RA. C/O RLE edema.  Denies abdominal pain, N/V, diarrhea, or constipation. Denies dysuria. Denies change in appetite. Denies insomnia.     Review of Systems ROS as described in in HPI     Physical Exam  Constitutional:       Comments: Sitting in WC   HENT:      Head: Normocephalic.      Mouth/Throat:      Mouth: Mucous membranes are moist.   Cardiovascular:      Rate and Rhythm: Normal rate and regular rhythm.   Pulmonary:      Effort: Pulmonary effort is normal.      Breath sounds: Normal breath sounds.      Comments: RA  Abdominal:      General: Bowel sounds are normal.   Genitourinary:     Comments: Denies dysuria   Musculoskeletal:      Cervical back: Neck supple.      Right lower leg: Right lower leg edema: plus two RLE/Foot edema.      Comments: WC  Chronic bilateral hip and left knee pain    Skin:     General: Skin is warm and dry.   Neurological:      Mental Status: She is alert. Mental status is at baseline.   Psychiatric:         Mood and Affect: Mood  normal.

## 2023-06-29 NOTE — ASSESSMENT & PLAN NOTE
Apixaban 5 mg by mouth BID   Dr Quinteros (Vascular) apt 7/3/2023 - Send US results with client to apt  US RLE- Right CFV, proximal- distal FV, Popliteal vein

## 2023-06-29 NOTE — LETTER
Patient: Noemi Pérez  : 1950    Encounter Date: 2023    Patient ID: Noemi Pérez is a 72 y.o. female who is long term resident being seen and evaluated for multiple medical problems.  [id number]   1950    /82   Pulse 75   Temp 36.4 °C (97.6 °F)   Resp 15   Wt 80.7 kg (178 lb)   SpO2 98%   BMI 32.56 kg/m²     Assessment/Plan  Problem List Items Addressed This Visit          Coag and Thromboembolic    Acute deep vein thrombosis (DVT) of distal vein of right lower extremity (CMS/HCC) - Primary     Apixaban 5 mg by mouth BID   Dr Quinteros (Vascular) apt 7/3/2023 - Send US results with client to apt  US RLE- Right CFV, proximal- distal FV, Popliteal vein            Musculoskeletal and Injuries    Bilateral hip pain     Acetaminophen 650 mg by mouth every 8 hrs as needed  Add Tramadol 50 mg  by mouth every 8 hrs as needed               HPI: Client pending apt with vascular, new RLE DVT. Client C/O pain to bilateral hips, chronic. Client denies HA, dizziness, or lightheadedness. Denies CP, SOB, or Cough,. RA. C/O RLE edema.  Denies abdominal pain, N/V, diarrhea, or constipation. Denies dysuria. Denies change in appetite. Denies insomnia.     Review of Systems ROS as described in in HPI     Physical Exam  Constitutional:       Comments: Sitting in WC   HENT:      Head: Normocephalic.      Mouth/Throat:      Mouth: Mucous membranes are moist.   Cardiovascular:      Rate and Rhythm: Normal rate and regular rhythm.   Pulmonary:      Effort: Pulmonary effort is normal.      Breath sounds: Normal breath sounds.      Comments: RA  Abdominal:      General: Bowel sounds are normal.   Genitourinary:     Comments: Denies dysuria   Musculoskeletal:      Cervical back: Neck supple.      Right lower leg: Right lower leg edema: plus two RLE/Foot edema.      Comments: WC  Chronic bilateral hip and left knee pain    Skin:     General: Skin is warm and dry.   Neurological:      Mental Status: She is alert.  Mental status is at baseline.   Psychiatric:         Mood and Affect: Mood normal.                   Electronically Signed By: HALEIGH Mcgovern   6/29/23  2:19 PM

## 2023-06-29 NOTE — ASSESSMENT & PLAN NOTE
Acetaminophen 650 mg by mouth every 8 hrs as needed  Add Tramadol 50 mg  by mouth every 8 hrs as needed

## 2023-07-13 ENCOUNTER — NURSING HOME VISIT (OUTPATIENT)
Dept: POST ACUTE CARE | Facility: EXTERNAL LOCATION | Age: 73
End: 2023-07-13
Payer: MEDICARE

## 2023-07-13 DIAGNOSIS — M16.0 OSTEOARTHRITIS OF BOTH HIPS, UNSPECIFIED OSTEOARTHRITIS TYPE: ICD-10-CM

## 2023-07-13 DIAGNOSIS — E78.5 HYPERLIPIDEMIA, UNSPECIFIED HYPERLIPIDEMIA TYPE: Primary | ICD-10-CM

## 2023-07-13 DIAGNOSIS — I82.4Z1 ACUTE DEEP VEIN THROMBOSIS (DVT) OF DISTAL VEIN OF RIGHT LOWER EXTREMITY (MULTI): ICD-10-CM

## 2023-07-13 DIAGNOSIS — N18.9 CHRONIC KIDNEY DISEASE, UNSPECIFIED CKD STAGE: ICD-10-CM

## 2023-07-13 DIAGNOSIS — G62.9 NEUROPATHY: ICD-10-CM

## 2023-07-13 DIAGNOSIS — N32.81 OAB (OVERACTIVE BLADDER): ICD-10-CM

## 2023-07-13 DIAGNOSIS — K59.00 CONSTIPATION, UNSPECIFIED CONSTIPATION TYPE: ICD-10-CM

## 2023-07-13 DIAGNOSIS — F32.A DEPRESSION, UNSPECIFIED DEPRESSION TYPE: ICD-10-CM

## 2023-07-13 DIAGNOSIS — R12 HEART BURN: ICD-10-CM

## 2023-07-13 DIAGNOSIS — D64.9 ANEMIA, UNSPECIFIED TYPE: ICD-10-CM

## 2023-07-13 PROCEDURE — 99309 SBSQ NF CARE MODERATE MDM 30: CPT | Performed by: NURSE PRACTITIONER

## 2023-07-13 NOTE — LETTER
Patient: Noemi Pérez  : 1950    Encounter Date: 2023    Patient ID: Noemi Pérez is a 72 y.o. female who is long term resident being seen and evaluated for multiple medical problems.  [id number]   1950    /72   Pulse 88   Temp 36.7 °C (98 °F)   Resp 15   Wt 79.4 kg (175 lb)   SpO2 98%   BMI 32.01 kg/m²     Assessment/Plan  Problem List Items Addressed This Visit          Cardiac and Vasculature    Hyperlipidemia - Primary     2023 C 222, T 215, HDL 23,   Pravastatin 10 mg by mouth every HS             Coag and Thromboembolic    Acute deep vein thrombosis (DVT) of distal vein of right lower extremity (CMS/HCC)     Apixaban 5 mg by mouth BID   Dr Quinteros (Vascular) apt 7/3/2023 completed  Shane Palacios  Follow up apt 10/3/2023  US RLE- Right CFV, proximal- distal FV, Popliteal vein            Gastrointestinal and Abdominal    Constipation     Colace 100 mg by mouth BID  MiraLax 17 grams by mouth daily   MOM 30 ml by mouth daily as needed         Heart burn     Tums 500 mg two tablets by mouth every 6 hrs as needed             Genitourinary and Reproductive    OAB (overactive bladder)     Oxybutynin ER 15 mg  by mouth daily          Chronic kidney disease     2023 BUN/Creat 16/1.57    Monitor renal panel  Avoid NSAIDS            Hematology and Neoplasia    Anemia     2023 Iron 29  Ferrous sulfate 325 mg by mouth daily   B12 166  B12 1000 mcg IM monthly             Mental Health    Depression     Lexapro 10 mg by mouth daily   Aripiprazole 10 mg by mouth daily            Musculoskeletal and Injuries    Osteoarthritis of both hips     Tramadol 50 mg by mouth every 8 hrs as needed   Acetaminophen 650 mg by mouth every 4 hrs as needed  Apt with Dr Rueda (Ortho)- 2023 completed   Tentative Left Total Hip Arthroplasty 2023 cancelled, recent acute RLE DVT              Neuro    Neuropathy     Gabapentin 300 mg by mouth TID                HPI: Client completed apt with  Dr Quinteros (Vascular), RLE DVT. Client denies HA, dizziness, or lightheadedness. Denies CP, SOB, Cough, or increased edema. RA. Denies abdominal pain, N/V, diarrhea, or constipation. Denies dysuria. Denies change in appetite. Denies insomnia. C/O chronic bilateral hip and left knee pain.     Review of Systems ROS as described in in HPI     Physical Exam  Constitutional:       Comments: Sitting in WC   HENT:      Head: Normocephalic.      Mouth/Throat:      Mouth: Mucous membranes are moist.   Cardiovascular:      Rate and Rhythm: Normal rate.   Pulmonary:      Effort: Pulmonary effort is normal.      Breath sounds: Normal breath sounds.      Comments: RA  Abdominal:      General: Bowel sounds are normal.   Genitourinary:     Comments: Denies dysuria   Musculoskeletal:      Cervical back: Neck supple.      Right lower leg: Edema (plus one (edema has decreased)) present.      Comments:  transfers  Walker   OA bilateral hips   Skin:     General: Skin is warm and dry.   Neurological:      Mental Status: She is alert. Mental status is at baseline.   Psychiatric:         Mood and Affect: Mood normal.      Comments: Pleasant                    Electronically Signed By: CYNDEE Mcgovern-CNP   7/15/23  9:26 AM

## 2023-07-15 VITALS
DIASTOLIC BLOOD PRESSURE: 72 MMHG | WEIGHT: 175 LBS | HEART RATE: 88 BPM | RESPIRATION RATE: 15 BRPM | SYSTOLIC BLOOD PRESSURE: 126 MMHG | BODY MASS INDEX: 32.01 KG/M2 | OXYGEN SATURATION: 98 % | TEMPERATURE: 98 F

## 2023-07-15 PROBLEM — R12 HEART BURN: Status: ACTIVE | Noted: 2023-07-15

## 2023-07-15 NOTE — PROGRESS NOTES
Patient ID: Noemi Pérez is a 72 y.o. female who is long term resident being seen and evaluated for multiple medical problems.  [id number]   1950    /72   Pulse 88   Temp 36.7 °C (98 °F)   Resp 15   Wt 79.4 kg (175 lb)   SpO2 98%   BMI 32.01 kg/m²     Assessment/Plan  Problem List Items Addressed This Visit          Cardiac and Vasculature    Hyperlipidemia - Primary     5/1/2023 C 222, T 215, HDL 23,   Pravastatin 10 mg by mouth every HS             Coag and Thromboembolic    Acute deep vein thrombosis (DVT) of distal vein of right lower extremity (CMS/HCC)     Apixaban 5 mg by mouth BID   Dr Quinteros (Vascular) apt 7/3/2023 completed  Shane Palacios  Follow up apt 10/3/2023  US RLE- Right CFV, proximal- distal FV, Popliteal vein            Gastrointestinal and Abdominal    Constipation     Colace 100 mg by mouth BID  MiraLax 17 grams by mouth daily   MOM 30 ml by mouth daily as needed         Heart burn     Tums 500 mg two tablets by mouth every 6 hrs as needed             Genitourinary and Reproductive    OAB (overactive bladder)     Oxybutynin ER 15 mg  by mouth daily          Chronic kidney disease     4/26/2023 BUN/Creat 16/1.57    Monitor renal panel  Avoid NSAIDS            Hematology and Neoplasia    Anemia     6/2/2023 Iron 29  Ferrous sulfate 325 mg by mouth daily   B12 166  B12 1000 mcg IM monthly             Mental Health    Depression     Lexapro 10 mg by mouth daily   Aripiprazole 10 mg by mouth daily            Musculoskeletal and Injuries    Osteoarthritis of both hips     Tramadol 50 mg by mouth every 8 hrs as needed   Acetaminophen 650 mg by mouth every 4 hrs as needed  Apt with Dr Rueda (Ortho)- 5/23/2023 completed   Tentative Left Total Hip Arthroplasty 7/31/2023 cancelled, recent acute RLE DVT              Neuro    Neuropathy     Gabapentin 300 mg by mouth TID                HPI: Client completed apt with Dr Quinteros (Vascular), RLE DVT. Client denies HA, dizziness, or  lightheadedness. Denies CP, SOB, Cough, or increased edema. RA. Denies abdominal pain, N/V, diarrhea, or constipation. Denies dysuria. Denies change in appetite. Denies insomnia. C/O chronic bilateral hip and left knee pain.     Review of Systems ROS as described in in HPI     Physical Exam  Constitutional:       Comments: Sitting in WC   HENT:      Head: Normocephalic.      Mouth/Throat:      Mouth: Mucous membranes are moist.   Cardiovascular:      Rate and Rhythm: Normal rate.   Pulmonary:      Effort: Pulmonary effort is normal.      Breath sounds: Normal breath sounds.      Comments: RA  Abdominal:      General: Bowel sounds are normal.   Genitourinary:     Comments: Denies dysuria   Musculoskeletal:      Cervical back: Neck supple.      Right lower leg: Edema (plus one (edema has decreased)) present.      Comments:  transfers  Walker   OA bilateral hips   Skin:     General: Skin is warm and dry.   Neurological:      Mental Status: She is alert. Mental status is at baseline.   Psychiatric:         Mood and Affect: Mood normal.      Comments: Pleasant

## 2023-07-15 NOTE — ASSESSMENT & PLAN NOTE
Apixaban 5 mg by mouth BID   Dr Quinteros (Vascular) apt 7/3/2023 completed  Shane Palacios  Follow up apt 10/3/2023  US RLE- Right CFV, proximal- distal FV, Popliteal vein

## 2023-07-15 NOTE — ASSESSMENT & PLAN NOTE
Tramadol 50 mg by mouth every 8 hrs as needed   Acetaminophen 650 mg by mouth every 4 hrs as needed  Apt with Dr Rueda (Ortho)- 5/23/2023 completed   Tentative Left Total Hip Arthroplasty 7/31/2023 cancelled, recent acute RLE DVT

## 2023-07-19 ENCOUNTER — HOSPITAL ENCOUNTER (OUTPATIENT)
Dept: DATA CONVERSION | Facility: HOSPITAL | Age: 73
Discharge: HOME | End: 2023-07-19

## 2023-07-19 DIAGNOSIS — M16.12 UNILATERAL PRIMARY OSTEOARTHRITIS, LEFT HIP: ICD-10-CM

## 2023-09-05 ENCOUNTER — NURSING HOME VISIT (OUTPATIENT)
Dept: POST ACUTE CARE | Facility: EXTERNAL LOCATION | Age: 73
End: 2023-09-05
Payer: MEDICARE

## 2023-09-05 DIAGNOSIS — N18.9 CHRONIC KIDNEY DISEASE, UNSPECIFIED CKD STAGE: ICD-10-CM

## 2023-09-05 DIAGNOSIS — I82.4Z1 ACUTE DEEP VEIN THROMBOSIS (DVT) OF DISTAL VEIN OF RIGHT LOWER EXTREMITY (MULTI): Primary | ICD-10-CM

## 2023-09-05 DIAGNOSIS — M16.0 OSTEOARTHRITIS OF BOTH HIPS, UNSPECIFIED OSTEOARTHRITIS TYPE: ICD-10-CM

## 2023-09-05 DIAGNOSIS — D64.9 ANEMIA, UNSPECIFIED TYPE: ICD-10-CM

## 2023-09-05 PROCEDURE — 99308 SBSQ NF CARE LOW MDM 20: CPT | Performed by: NURSE PRACTITIONER

## 2023-09-05 NOTE — LETTER
Patient: Noemi Pérez  : 1950    Encounter Date: 2023    Patient ID: Noemi Pérez is a 72 y.o. female who is long term resident being seen and evaluated for multiple medical problems.  [id number]   1950    /70   Pulse 78   Temp 36.7 °C (98 °F)   Resp 15   Wt 79.8 kg (176 lb)   SpO2 99%   BMI 32.19 kg/m²     Assessment/Plan  Problem List Items Addressed This Visit       Osteoarthritis of both hips     Decrease Tramadol to 50 mg by mouth BID as needed   Acetaminophen 650 mg by mouth every 4 hrs as needed  Apt with Dr Rueda (Ortho)- 2023 completed   Tentative Left Total Hip Arthroplasty 2023 cancelled, recent acute RLE DVT            Chronic kidney disease     2023 BUN/Creat 16/1.57    Monitor renal panel  Avoid NSAIDS  2023 BMP         Anemia     2023 Iron 29  Ferrous sulfate 325 mg by mouth daily   B12 166  B12 1000 mcg IM monthly   2023 CBC with diff, Iron, TIBC, B12 level          Acute deep vein thrombosis (DVT) of distal vein of right lower extremity (CMS/HCC) - Primary     Apixaban 5 mg by mouth BID   Dr Quinteros (Vascular) apt 7/3/2023 completed  Shane Palacios  Follow up apt 10/3/2023  US RLE- Right CFV, proximal- distal FV, Popliteal vein              HPI: Client denies HA, dizziness, or lightheadedness. Denies CP, SOB, Cough, or increased edema. RA. Denies abdominal pain, N/V, diarrhea, or constipation. Denies dysuria. Denies change in appetite. Denies insomnia. Denies current pain.      Review of Systems ROS as described in in HPI     Physical Exam  Constitutional:       Comments: Resting in bed    HENT:      Mouth/Throat:      Mouth: Mucous membranes are moist.   Cardiovascular:      Rate and Rhythm: Normal rate.   Pulmonary:      Effort: Pulmonary effort is normal.      Breath sounds: Normal breath sounds.      Comments: RA  Abdominal:      General: Bowel sounds are normal.   Genitourinary:     Comments: Denies dysuria   Musculoskeletal:      Cervical  back: Neck supple.      Comments: WC transfers   Chronic pain left knee and hip    Skin:     General: Skin is warm and dry.   Neurological:      Mental Status: She is alert. Mental status is at baseline.   Psychiatric:         Mood and Affect: Mood normal.                   Electronically Signed By: HALEIGH Mcgovern   9/6/23  4:54 PM

## 2023-09-06 VITALS
TEMPERATURE: 98 F | OXYGEN SATURATION: 99 % | DIASTOLIC BLOOD PRESSURE: 70 MMHG | RESPIRATION RATE: 15 BRPM | WEIGHT: 176 LBS | BODY MASS INDEX: 32.19 KG/M2 | HEART RATE: 78 BPM | SYSTOLIC BLOOD PRESSURE: 117 MMHG

## 2023-09-06 NOTE — ASSESSMENT & PLAN NOTE
6/2/2023 Iron 29  Ferrous sulfate 325 mg by mouth daily   B12 166  B12 1000 mcg IM monthly   9/11/2023 CBC with diff, Iron, TIBC, B12 level

## 2023-09-06 NOTE — PROGRESS NOTES
Patient ID: Noemi Pérez is a 72 y.o. female who is long term resident being seen and evaluated for multiple medical problems.  [id number]   1950    /70   Pulse 78   Temp 36.7 °C (98 °F)   Resp 15   Wt 79.8 kg (176 lb)   SpO2 99%   BMI 32.19 kg/m²     Assessment/Plan  Problem List Items Addressed This Visit       Osteoarthritis of both hips     Decrease Tramadol to 50 mg by mouth BID as needed   Acetaminophen 650 mg by mouth every 4 hrs as needed  Apt with Dr Rudea (Ortho)- 5/23/2023 completed   Tentative Left Total Hip Arthroplasty 7/31/2023 cancelled, recent acute RLE DVT            Chronic kidney disease     4/26/2023 BUN/Creat 16/1.57    Monitor renal panel  Avoid NSAIDS  9/11/2023 BMP         Anemia     6/2/2023 Iron 29  Ferrous sulfate 325 mg by mouth daily   B12 166  B12 1000 mcg IM monthly   9/11/2023 CBC with diff, Iron, TIBC, B12 level          Acute deep vein thrombosis (DVT) of distal vein of right lower extremity (CMS/HCC) - Primary     Apixaban 5 mg by mouth BID   Dr Quinteros (Vascular) apt 7/3/2023 completed  Shane Hose  Follow up apt 10/3/2023  US RLE- Right CFV, proximal- distal FV, Popliteal vein              HPI: Client denies HA, dizziness, or lightheadedness. Denies CP, SOB, Cough, or increased edema. RA. Denies abdominal pain, N/V, diarrhea, or constipation. Denies dysuria. Denies change in appetite. Denies insomnia. Denies current pain.      Review of Systems ROS as described in in HPI     Physical Exam  Constitutional:       Comments: Resting in bed    HENT:      Mouth/Throat:      Mouth: Mucous membranes are moist.   Cardiovascular:      Rate and Rhythm: Normal rate.   Pulmonary:      Effort: Pulmonary effort is normal.      Breath sounds: Normal breath sounds.      Comments: RA  Abdominal:      General: Bowel sounds are normal.   Genitourinary:     Comments: Denies dysuria   Musculoskeletal:      Cervical back: Neck supple.      Comments: WC transfers   Chronic pain left knee  and hip    Skin:     General: Skin is warm and dry.   Neurological:      Mental Status: She is alert. Mental status is at baseline.   Psychiatric:         Mood and Affect: Mood normal.

## 2023-09-06 NOTE — ASSESSMENT & PLAN NOTE
Decrease Tramadol to 50 mg by mouth BID as needed   Acetaminophen 650 mg by mouth every 4 hrs as needed  Apt with Dr Rueda (Ortho)- 5/23/2023 completed   Tentative Left Total Hip Arthroplasty 7/31/2023 cancelled, recent acute RLE DVT

## 2023-09-28 ENCOUNTER — NURSING HOME VISIT (OUTPATIENT)
Dept: POST ACUTE CARE | Facility: EXTERNAL LOCATION | Age: 73
End: 2023-09-28
Payer: MEDICARE

## 2023-09-28 VITALS
TEMPERATURE: 98 F | DIASTOLIC BLOOD PRESSURE: 78 MMHG | RESPIRATION RATE: 15 BRPM | OXYGEN SATURATION: 98 % | SYSTOLIC BLOOD PRESSURE: 124 MMHG | WEIGHT: 177 LBS | HEART RATE: 72 BPM | BODY MASS INDEX: 32.41 KG/M2

## 2023-09-28 DIAGNOSIS — I82.4Z1 ACUTE DEEP VEIN THROMBOSIS (DVT) OF DISTAL VEIN OF RIGHT LOWER EXTREMITY (MULTI): Primary | ICD-10-CM

## 2023-09-28 DIAGNOSIS — M16.0 OSTEOARTHRITIS OF BOTH HIPS, UNSPECIFIED OSTEOARTHRITIS TYPE: ICD-10-CM

## 2023-09-28 DIAGNOSIS — M62.838 MUSCLE SPASM: ICD-10-CM

## 2023-09-28 PROCEDURE — 99308 SBSQ NF CARE LOW MDM 20: CPT | Performed by: NURSE PRACTITIONER

## 2023-09-28 NOTE — PROGRESS NOTES
Patient ID: Noemi Pérez is a 72 y.o. female who is long term resident being seen and evaluated for multiple medical problems.  [id number]   1950    /78   Pulse 72   Temp 36.7 °C (98 °F)   Resp 15   Wt 80.3 kg (177 lb)   SpO2 98%   BMI 32.41 kg/m²     Assessment/Plan  Problem List Items Addressed This Visit       Osteoarthritis of both hips     Tramadol to 50 mg by mouth BID as needed   Acetaminophen 650 mg by mouth every 8 hrs as needed  Apt with Dr Rueda (Ortho)- 5/23/2023 completed   Tentative Left Total Hip Arthroplasty 7/31/2023 cancelled, recent acute RLE DVT            Acute deep vein thrombosis (DVT) of distal vein of right lower extremity (CMS/HCC) - Primary     Apixaban 5 mg by mouth BID   Dr Quinteros (Vascular) apt 7/3/2023 completed  Shane Hose  Follow up apt 10/3/2023  US RLE- Right CFV, proximal- distal FV, Popliteal vein         Muscle spasm     BLE/Back  Baclofen 5 mg by mouth BID as needed               HPI: Client requesting a muscle relaxant. She is C/O BLE and lower back muscle spasms. She also C/O bilateral hip pain. H/O OA. Client denies HA, dizziness, or lightheadedness. Denies CP, SOB, Cough, or increased edema. RA. Denies abdominal pain, N/V, diarrhea, or constipation. Denies dysuria. Denies change in appetite.     Review of Systems ROS as described in in HPI     Physical Exam  Constitutional:       Comments: Resting in bed    HENT:      Mouth/Throat:      Mouth: Mucous membranes are moist.   Cardiovascular:      Rate and Rhythm: Normal rate.   Pulmonary:      Effort: Pulmonary effort is normal.      Comments: RA  Abdominal:      General: Bowel sounds are normal.   Genitourinary:     Comments: Denies dysuria   Musculoskeletal:      Cervical back: Neck supple.      Right lower leg: Edema (plus one) present.      Left lower leg: Edema (plus one) present.      Comments: WC transfers  Bilateral hip OA   Skin:     General: Skin is warm and dry.   Neurological:      Mental Status: She  is alert. Mental status is at baseline.   Psychiatric:         Mood and Affect: Mood normal.

## 2023-09-28 NOTE — ASSESSMENT & PLAN NOTE
Tramadol to 50 mg by mouth BID as needed   Acetaminophen 650 mg by mouth every 8 hrs as needed  Apt with Dr Rueda (Ortho)- 5/23/2023 completed   Tentative Left Total Hip Arthroplasty 7/31/2023 cancelled, recent acute RLE DVT

## 2023-09-28 NOTE — LETTER
Patient: Noemi Pérez  : 1950    Encounter Date: 2023    Patient ID: Noemi Pérez is a 72 y.o. female who is long term resident being seen and evaluated for multiple medical problems.  [id number]   1950    /78   Pulse 72   Temp 36.7 °C (98 °F)   Resp 15   Wt 80.3 kg (177 lb)   SpO2 98%   BMI 32.41 kg/m²     Assessment/Plan  Problem List Items Addressed This Visit       Osteoarthritis of both hips     Tramadol to 50 mg by mouth BID as needed   Acetaminophen 650 mg by mouth every 8 hrs as needed  Apt with Dr Rueda (Ortho)- 2023 completed   Tentative Left Total Hip Arthroplasty 2023 cancelled, recent acute RLE DVT            Acute deep vein thrombosis (DVT) of distal vein of right lower extremity (CMS/HCC) - Primary     Apixaban 5 mg by mouth BID   Dr Quinteros (Vascular) apt 7/3/2023 completed  Shane Hose  Follow up apt 10/3/2023  US RLE- Right CFV, proximal- distal FV, Popliteal vein         Muscle spasm     BLE/Back  Baclofen 5 mg by mouth BID as needed               HPI: Client requesting a muscle relaxant. She is C/O BLE and lower back muscle spasms. She also C/O bilateral hip pain. H/O OA. Client denies HA, dizziness, or lightheadedness. Denies CP, SOB, Cough, or increased edema. RA. Denies abdominal pain, N/V, diarrhea, or constipation. Denies dysuria. Denies change in appetite.     Review of Systems ROS as described in in HPI     Physical Exam  Constitutional:       Comments: Resting in bed    HENT:      Mouth/Throat:      Mouth: Mucous membranes are moist.   Cardiovascular:      Rate and Rhythm: Normal rate.   Pulmonary:      Effort: Pulmonary effort is normal.      Comments: RA  Abdominal:      General: Bowel sounds are normal.   Genitourinary:     Comments: Denies dysuria   Musculoskeletal:      Cervical back: Neck supple.      Right lower leg: Edema (plus one) present.      Left lower leg: Edema (plus one) present.      Comments: WC transfers  Bilateral hip OA   Skin:      General: Skin is warm and dry.   Neurological:      Mental Status: She is alert. Mental status is at baseline.   Psychiatric:         Mood and Affect: Mood normal.                   Electronically Signed By: HALEIGH Mcgovern   9/28/23  7:06 PM

## 2023-10-31 ENCOUNTER — OFFICE VISIT (OUTPATIENT)
Dept: CARDIOLOGY | Facility: CLINIC | Age: 73
End: 2023-10-31
Payer: MEDICARE

## 2023-10-31 VITALS
HEIGHT: 62 IN | DIASTOLIC BLOOD PRESSURE: 74 MMHG | BODY MASS INDEX: 34.96 KG/M2 | WEIGHT: 190 LBS | SYSTOLIC BLOOD PRESSURE: 120 MMHG | HEART RATE: 54 BPM | RESPIRATION RATE: 18 BRPM | OXYGEN SATURATION: 99 %

## 2023-10-31 DIAGNOSIS — E78.5 HYPERLIPIDEMIA, UNSPECIFIED HYPERLIPIDEMIA TYPE: ICD-10-CM

## 2023-10-31 DIAGNOSIS — I10 HYPERTENSION, UNSPECIFIED TYPE: ICD-10-CM

## 2023-10-31 DIAGNOSIS — R60.0 EDEMA LEG: ICD-10-CM

## 2023-10-31 DIAGNOSIS — I82.4Z1 ACUTE DEEP VEIN THROMBOSIS (DVT) OF DISTAL VEIN OF RIGHT LOWER EXTREMITY (MULTI): ICD-10-CM

## 2023-10-31 DIAGNOSIS — R60.9 EDEMA, UNSPECIFIED TYPE: Primary | ICD-10-CM

## 2023-10-31 PROCEDURE — 93010 ELECTROCARDIOGRAM REPORT: CPT | Performed by: INTERNAL MEDICINE

## 2023-10-31 PROCEDURE — 99205 OFFICE O/P NEW HI 60 MIN: CPT | Performed by: INTERNAL MEDICINE

## 2023-10-31 PROCEDURE — 1160F RVW MEDS BY RX/DR IN RCRD: CPT | Performed by: INTERNAL MEDICINE

## 2023-10-31 PROCEDURE — 3008F BODY MASS INDEX DOCD: CPT | Performed by: INTERNAL MEDICINE

## 2023-10-31 PROCEDURE — 1036F TOBACCO NON-USER: CPT | Performed by: INTERNAL MEDICINE

## 2023-10-31 PROCEDURE — 93005 ELECTROCARDIOGRAM TRACING: CPT | Performed by: INTERNAL MEDICINE

## 2023-10-31 PROCEDURE — 3078F DIAST BP <80 MM HG: CPT | Performed by: INTERNAL MEDICINE

## 2023-10-31 PROCEDURE — 99215 OFFICE O/P EST HI 40 MIN: CPT | Performed by: INTERNAL MEDICINE

## 2023-10-31 PROCEDURE — 3074F SYST BP LT 130 MM HG: CPT | Performed by: INTERNAL MEDICINE

## 2023-10-31 PROCEDURE — 1159F MED LIST DOCD IN RCRD: CPT | Performed by: INTERNAL MEDICINE

## 2023-10-31 RX ORDER — FUROSEMIDE 40 MG/1
40 TABLET ORAL DAILY
Qty: 30 TABLET | Refills: 11 | Status: SHIPPED | OUTPATIENT
Start: 2023-10-31 | End: 2024-10-30

## 2023-10-31 RX ORDER — APIXABAN 5 MG/1
5 TABLET, FILM COATED ORAL 2 TIMES DAILY
COMMUNITY
Start: 2023-06-24

## 2023-10-31 RX ORDER — ONDANSETRON 4 MG/1
4 TABLET, FILM COATED ORAL EVERY 6 HOURS PRN
COMMUNITY
Start: 2023-06-11

## 2023-10-31 RX ORDER — BACLOFEN 5 MG/1
5 TABLET ORAL 2 TIMES DAILY
COMMUNITY
Start: 2023-10-27

## 2023-10-31 RX ORDER — CYANOCOBALAMIN 1000 UG/ML
1000 INJECTION, SOLUTION INTRAMUSCULAR; SUBCUTANEOUS
COMMUNITY
Start: 2023-06-08

## 2023-10-31 ASSESSMENT — ENCOUNTER SYMPTOMS
LOSS OF SENSATION IN FEET: 1
OCCASIONAL FEELINGS OF UNSTEADINESS: 1
AGITATION: 1
DEPRESSION: 0
WEAKNESS: 1
ARTHRALGIAS: 1

## 2023-10-31 ASSESSMENT — PATIENT HEALTH QUESTIONNAIRE - PHQ9
2. FEELING DOWN, DEPRESSED OR HOPELESS: SEVERAL DAYS
SUM OF ALL RESPONSES TO PHQ9 QUESTIONS 1 AND 2: 2
1. LITTLE INTEREST OR PLEASURE IN DOING THINGS: SEVERAL DAYS

## 2023-10-31 NOTE — PROGRESS NOTES
Referred by Dr. Bailey for New Patient Visit (DVT)     History Of Present Illness:    Noemi Pérez is a 72 y.o. female presenting with leg swelling.  She currently is residing at [facility]--she has difficulty ambulating, has history of UTIs.  Hx of OA and dyslipidemia.  Unclear on current plans for going home, however states that she was looking into hip surgery in July, however this then was delayed when at [facility] they did a mobile ultrasound and found to have a DVT.  She did see vascular surgery in September at [facility], however she states there was not a plan for followup.  Denies any angina orthopnea.  Right lower extremity swelling is intermittent but bothersome still.      Past Medical History:  She has a past medical history of Personal history of other diseases of the circulatory system.    Past Surgical History:  She has a past surgical history that includes IR nephrostomy tube exchange (2/10/2023).      Social History:  She reports that she has quit smoking. Her smoking use included cigarettes. She has been exposed to tobacco smoke. She has never used smokeless tobacco. She reports that she does not drink alcohol and does not use drugs.    Family History:  Family History   Problem Relation Name Age of Onset    Hypertension Mother      Lung cancer Father          Allergies:  Patient has no known allergies.    Outpatient Medications:  Current Outpatient Medications   Medication Instructions    ARIPiprazole (ABILIFY) 10 mg, oral, Daily    baclofen (LIORESAL) 5 mg, 2 times daily    cyanocobalamin (VITAMIN B-12) 1,000 mcg, intramuscular, Every 30 days    Eliquis 5 mg, 2 times daily    escitalopram (Lexapro) 10 mg tablet TAKE ONE TABLET BY MOUTH EVERY DAY    gabapentin (NEURONTIN) 400 mg, oral, 3 times daily    ondansetron (ZOFRAN) 4 mg, Every 6 hours PRN    oxybutynin XL (Ditropan-XL) 15 mg 24 hr tablet TAKE ONE TABLET BY MOUTH EVERY DAY DO NOT CRUSH,CHEW OR SPLIT    pravastatin (Pravachol)  "10 mg tablet         Last Recorded Vitals:  Vitals:    10/31/23 0902   BP: 120/74   BP Location: Left arm   Patient Position: Sitting   BP Cuff Size: Large adult   Pulse: 54   Resp: 18   SpO2: 99%   Weight: 86.2 kg (190 lb)   Height: 1.575 m (5' 2\")   Review of Systems   Cardiovascular:  Positive for leg swelling.   Musculoskeletal:  Positive for arthralgias.   Neurological:  Positive for weakness.   Psychiatric/Behavioral:  Positive for agitation.    All other systems reviewed and are negative.       Physical Exam:  Constitutional:       Appearance: Healthy appearance. Not in distress.   Neck:      Vascular: No JVR. JVD normal.   Pulmonary:      Effort: Pulmonary effort is normal.      Breath sounds: Normal breath sounds. No wheezing. No rhonchi. No rales.   Chest:      Chest wall: Not tender to palpatation.   Cardiovascular:      Normal rate. Regular rhythm.      Murmurs: There is no murmur.   Edema:     Peripheral edema present.     Pretibial: trace edema of the left pretibial area and 2+ edema of the right pretibial area.     Ankle: trace edema of the left ankle and 2+ edema of the right ankle.     Feet: trace edema of the left foot and 2+ edema of the right foot.  Abdominal:      General: Bowel sounds are normal.      Palpations: Abdomen is soft.      Tenderness: There is no abdominal tenderness.   Musculoskeletal: Normal range of motion. Skin:     General: Skin is warm and dry.   Neurological:      General: No focal deficit present.      Mental Status: Alert and oriented to person, place and time.             Last Labs:  CBC -  Lab Results   Component Value Date    WBC 10.8 04/26/2023    HGB 11.8 (L) 04/26/2023    HCT 40.4 04/26/2023    MCV 83 04/26/2023     04/26/2023       CMP -  Lab Results   Component Value Date    CALCIUM 10.1 04/26/2023    PHOS 3.2 02/11/2023    PROT 8.8 (H) 04/26/2023    ALBUMIN 4.2 04/26/2023    AST 14 04/26/2023    ALT 12 04/26/2023    ALKPHOS 114 04/26/2023    BILITOT 0.5 " "04/26/2023       LIPID PANEL -   No results found for: \"CHOL\", \"TRIG\", \"HDL\", \"CHHDL\", \"LDLF\", \"VLDL\", \"NHDL\"    RENAL FUNCTION PANEL -   Lab Results   Component Value Date    GLUCOSE 132 (H) 04/26/2023     (L) 04/26/2023    K 4.4 04/26/2023     04/26/2023    CO2 24 04/26/2023    ANIONGAP 13 04/26/2023    BUN 16 04/26/2023    CREATININE 1.57 (H) 04/26/2023    GFRMALE CANCELED 03/09/2023    CALCIUM 10.1 04/26/2023    PHOS 3.2 02/11/2023    ALBUMIN 4.2 04/26/2023        Lab Results   Component Value Date     (H) 12/09/2022    HGBA1C 5.3 08/15/2021       Last Cardiology Tests:  ECG indepdnently reviewed from today: sinus bradycardia, rate 49    Positive DVT right common femoral vein, proximal distal femoral vein and popliteal vein.  (Venous duplex scan dated June 12, 2023).      Assessment/Plan   Very pleasant 72-year-old female with ambulatory dysfunction, dyslipidemia, bipolar disorder who developed a DVT this summer.  She was started on apixaban and has been taking it.      Plan:  -continue apixaban for now  -will check 2D echo as she continues to have lower extremity swelling to ensure no heart failure component.  If echo is unremarkable, I do not think we need to pursue any further cardiac work-up.    -I am going to refer her to vascular medicine and we will start her on Lasix 40 mg daily in the interim given the discomfort she is having from her intermittent right lower extremity swelling.  -continue pravastatin for now        Miguelangel Bailey MD  "

## 2023-11-02 ENCOUNTER — NURSING HOME VISIT (OUTPATIENT)
Dept: POST ACUTE CARE | Facility: EXTERNAL LOCATION | Age: 73
End: 2023-11-02
Payer: MEDICARE

## 2023-11-02 DIAGNOSIS — R41.3 MEMORY IMPAIRMENT: ICD-10-CM

## 2023-11-02 DIAGNOSIS — R60.0 LEG EDEMA: Primary | ICD-10-CM

## 2023-11-02 DIAGNOSIS — I82.401 DEEP VEIN THROMBOSIS (DVT) OF RIGHT LOWER EXTREMITY, UNSPECIFIED CHRONICITY, UNSPECIFIED VEIN (MULTI): ICD-10-CM

## 2023-11-02 DIAGNOSIS — D64.9 ANEMIA, UNSPECIFIED TYPE: ICD-10-CM

## 2023-11-02 PROCEDURE — 99308 SBSQ NF CARE LOW MDM 20: CPT | Performed by: NURSE PRACTITIONER

## 2023-11-02 NOTE — LETTER
Patient: Noemi Pérez  : 1950    Encounter Date: 2023    Patient ID: Noemi Pérez is a 72 y.o. female who is long term resident being seen and evaluated for multiple medical problems.  [id number]   1950    /80   Pulse 76   Temp 36.7 °C (98 °F)   Resp 16   Wt 79.4 kg (175 lb)   SpO2 95%   BMI 32.01 kg/m²     Assessment/Plan  Problem List Items Addressed This Visit       Anemia     Ferrous sulfate 325 mg by mouth daily   B12 1000 mcg IM monthly   2023 CBC with diff          Leg edema - Primary     10/31/2023 Dr Bailey (Cardiology) apt  Prescribed Lasix 40 mg by mouth daily   Scheduled for NAT at [facility] 11/15/2023   2023 BMP            Deep vein thrombosis (DVT) of right lower extremity (CMS/HCC)     Apixaban 5 mg by mouth BID   Dr Quinteros (Vascular) apt [facility] completed 2023  Compression stockings          Memory impairment     Aricept 5 mg by mouth every HS               HPI: Staff report client's memory impairment worsening. Will prescribe Aricept and consult psychiatry. Client denies HA, dizziness, or lightheadedness. Denies CP, SOB, Cough, or increased edema. RA. Denies abdominal pain, N/V, diarrhea, or constipation. Denies dysuria. Denies change in appetite. No weight loss. C/O chronic hip pain, left worse.     Review of Systems ROS as described in in HPI     Physical Exam  Constitutional:       Comments: Sitting in WC   HENT:      Mouth/Throat:      Mouth: Mucous membranes are moist.   Cardiovascular:      Rate and Rhythm: Normal rate.   Pulmonary:      Effort: Pulmonary effort is normal.      Breath sounds: Normal breath sounds.      Comments: RA  Abdominal:      General: Bowel sounds are normal.   Genitourinary:     Comments: Denies dysuria   Musculoskeletal:      Cervical back: Neck supple.      Right lower leg: Edema (plus one ankles) present.      Left lower leg: Edema (plus one ankles) present.      Comments:  transfers   Chronic hip pain    Skin:      General: Skin is warm and dry.   Neurological:      Mental Status: She is alert. Mental status is at baseline.   Psychiatric:         Mood and Affect: Mood normal.                   Electronically Signed By: HALEIGH Mcgovern   11/4/23 11:18 AM

## 2023-11-04 VITALS
BODY MASS INDEX: 32.01 KG/M2 | HEART RATE: 76 BPM | WEIGHT: 175 LBS | SYSTOLIC BLOOD PRESSURE: 127 MMHG | RESPIRATION RATE: 16 BRPM | TEMPERATURE: 98 F | OXYGEN SATURATION: 95 % | DIASTOLIC BLOOD PRESSURE: 80 MMHG

## 2023-11-04 PROBLEM — I82.401 DEEP VEIN THROMBOSIS (DVT) OF RIGHT LOWER EXTREMITY (MULTI): Status: ACTIVE | Noted: 2023-11-04

## 2023-11-04 PROBLEM — R41.3 MEMORY IMPAIRMENT: Status: ACTIVE | Noted: 2023-11-04

## 2023-11-04 PROBLEM — H53.123 TRANSIENT VISUAL LOSS, BILATERAL: Status: ACTIVE | Noted: 2018-01-03

## 2023-11-04 PROBLEM — F31.9 BIPOLAR DISORDER (MULTI): Status: ACTIVE | Noted: 2023-02-08

## 2023-11-04 PROBLEM — R60.0 LEG EDEMA: Status: ACTIVE | Noted: 2023-11-04

## 2023-11-04 PROBLEM — H43.813 PVD (POSTERIOR VITREOUS DETACHMENT), BILATERAL: Status: ACTIVE | Noted: 2018-01-03

## 2023-11-04 PROBLEM — I82.4Z1 ACUTE DEEP VEIN THROMBOSIS (DVT) OF DISTAL VEIN OF RIGHT LOWER EXTREMITY (MULTI): Status: RESOLVED | Noted: 2023-06-18 | Resolved: 2023-11-04

## 2023-11-04 NOTE — ASSESSMENT & PLAN NOTE
10/31/2023 Dr Bailey (Cardiology) apt  Prescribed Lasix 40 mg by mouth daily   Scheduled for NAT at [facility] 11/15/2023   11/6/2023 BMP

## 2023-11-04 NOTE — PROGRESS NOTES
Patient ID: Noemi Pérez is a 72 y.o. female who is long term resident being seen and evaluated for multiple medical problems.  [id number]   1950    /80   Pulse 76   Temp 36.7 °C (98 °F)   Resp 16   Wt 79.4 kg (175 lb)   SpO2 95%   BMI 32.01 kg/m²     Assessment/Plan  Problem List Items Addressed This Visit       Anemia     Ferrous sulfate 325 mg by mouth daily   B12 1000 mcg IM monthly   11/6/2023 CBC with diff          Leg edema - Primary     10/31/2023 Dr Bailey (Cardiology) apt  Prescribed Lasix 40 mg by mouth daily   Scheduled for NAT at [facility] 11/15/2023   11/6/2023 BMP            Deep vein thrombosis (DVT) of right lower extremity (CMS/HCC)     Apixaban 5 mg by mouth BID   Dr Quinteros (Vascular) apt [facility] completed 9/18/2023  Compression stockings          Memory impairment     Aricept 5 mg by mouth every HS               HPI: Staff report client's memory impairment worsening. Will prescribe Aricept and consult psychiatry. Client denies HA, dizziness, or lightheadedness. Denies CP, SOB, Cough, or increased edema. RA. Denies abdominal pain, N/V, diarrhea, or constipation. Denies dysuria. Denies change in appetite. No weight loss. C/O chronic hip pain, left worse.     Review of Systems ROS as described in in HPI     Physical Exam  Constitutional:       Comments: Sitting in WC   HENT:      Mouth/Throat:      Mouth: Mucous membranes are moist.   Cardiovascular:      Rate and Rhythm: Normal rate.   Pulmonary:      Effort: Pulmonary effort is normal.      Breath sounds: Normal breath sounds.      Comments: RA  Abdominal:      General: Bowel sounds are normal.   Genitourinary:     Comments: Denies dysuria   Musculoskeletal:      Cervical back: Neck supple.      Right lower leg: Edema (plus one ankles) present.      Left lower leg: Edema (plus one ankles) present.      Comments: WC transfers   Chronic hip pain    Skin:     General: Skin is warm and dry.   Neurological:      Mental Status: She is  alert. Mental status is at baseline.   Psychiatric:         Mood and Affect: Mood normal.

## 2023-11-15 ENCOUNTER — HOSPITAL ENCOUNTER (OUTPATIENT)
Dept: CARDIOLOGY | Facility: HOSPITAL | Age: 73
Discharge: HOME | End: 2023-11-15
Payer: MEDICARE

## 2023-11-15 ENCOUNTER — OFFICE VISIT (OUTPATIENT)
Dept: CARDIOLOGY | Facility: HOSPITAL | Age: 73
End: 2023-11-15
Payer: MEDICARE

## 2023-11-15 VITALS
HEART RATE: 59 BPM | OXYGEN SATURATION: 99 % | BODY MASS INDEX: 34.57 KG/M2 | DIASTOLIC BLOOD PRESSURE: 67 MMHG | WEIGHT: 189 LBS | SYSTOLIC BLOOD PRESSURE: 107 MMHG

## 2023-11-15 DIAGNOSIS — R60.0 EDEMA LEG: ICD-10-CM

## 2023-11-15 DIAGNOSIS — I82.511: ICD-10-CM

## 2023-11-15 DIAGNOSIS — R60.9 EDEMA, UNSPECIFIED TYPE: ICD-10-CM

## 2023-11-15 DIAGNOSIS — R26.2 DIFFICULTY WALKING: Primary | ICD-10-CM

## 2023-11-15 PROCEDURE — 1036F TOBACCO NON-USER: CPT | Performed by: INTERNAL MEDICINE

## 2023-11-15 PROCEDURE — 3074F SYST BP LT 130 MM HG: CPT | Performed by: INTERNAL MEDICINE

## 2023-11-15 PROCEDURE — 3078F DIAST BP <80 MM HG: CPT | Performed by: INTERNAL MEDICINE

## 2023-11-15 PROCEDURE — 99214 OFFICE O/P EST MOD 30 MIN: CPT | Performed by: INTERNAL MEDICINE

## 2023-11-15 PROCEDURE — 1159F MED LIST DOCD IN RCRD: CPT | Performed by: INTERNAL MEDICINE

## 2023-11-15 PROCEDURE — 3008F BODY MASS INDEX DOCD: CPT | Performed by: INTERNAL MEDICINE

## 2023-11-15 PROCEDURE — 93306 TTE W/DOPPLER COMPLETE: CPT | Performed by: INTERNAL MEDICINE

## 2023-11-15 PROCEDURE — 1160F RVW MEDS BY RX/DR IN RCRD: CPT | Performed by: INTERNAL MEDICINE

## 2023-11-15 PROCEDURE — 93306 TTE W/DOPPLER COMPLETE: CPT

## 2023-11-15 RX ORDER — COVID-19 VACCINE, MRNA 50 UG/.5ML
0.5 INJECTION, SUSPENSION INTRAMUSCULAR ONCE
COMMUNITY

## 2023-11-15 RX ORDER — TRAMADOL HYDROCHLORIDE 50 MG/1
TABLET ORAL
COMMUNITY

## 2023-11-15 RX ORDER — FERROUS SULFATE 325(65) MG
65 TABLET, DELAYED RELEASE (ENTERIC COATED) ORAL
COMMUNITY

## 2023-11-15 RX ORDER — DONEPEZIL HYDROCHLORIDE 5 MG/1
5 TABLET, FILM COATED ORAL NIGHTLY
COMMUNITY

## 2023-11-15 NOTE — PATIENT INSTRUCTIONS
I recommend continuing your blood thinners (Eliquis) indefinitely. I would like to see you back in 6 months. I think it would be reasonable to get a repeat ultrasound of your leg to see how much residual scar is there, but it likely would NOT change my recommendations on your blood thinners. So I think let's hold off for the time being.    More important, I am a bit perplexed as to why you are completely unable to walk. I did reach out to the care team at your facility for some input. I think it would be reasonable for you to have an evaluation with physical medicine and rehabilitation, so I will put in a referral.    If you need to have hip surgery to help you be more functional, we can absolutely manage your blood thinners in the operative period. Being on blood thinners does mean you are not a candidate for surgery.    Should you have questions, please do not hesitate to call my office at [phone].

## 2023-11-15 NOTE — PROGRESS NOTES
Referred by Dr. Bailey for LE DVT     History of Present Illness:    Noemi Pérez is a 71 y/o woman who lives in long-term care facility due to physical debility for reasons that she doesn't understand. Referred by Dr. Bailey because of apparently unprovoked DVT in July 2023.     Can't transfer out of wheelchair by herself  Last able to really take care of herself more than a year ago    July really no provokers other than the fact that she's unable to walk  Both hips are bad, needs left hip replacement  Hasn't seen her primary doctor in ages because she says she hasn't had a reason to    Lots of surgeries--3 kidney surgeries, gallbladder, hemorrhoid, eardrum  Needs bilateral hip replacements  Saw someone in [place] for her DVT (Dr. Toney, [facility] surgery)    No prior personal history of VTE  No family history of VTE    She denies bleeding including epistaxis, gingival bleeding, hemoptysis, hematemesis, hematochezia, melena, hematuria, and vaginal bleeding.    Sitting a lot with legs dependent; elevates in bed; sometimes elevates during the day; has been wearing compression socks    Past Medical History:   Diagnosis Date    Personal history of other diseases of the circulatory system     History of hypertension     Past Surgical History:   Procedure Laterality Date    IR NEPHROSTOMY TUBE EXCHANGE  2/10/2023    IR NEPHROSTOMY TUBE EXCHANGE 2/10/2023 [facility] INPATIENT LEGAC[facility]           Social History:  She reports that she has quit smoking. Her smoking use included cigarettes. She has been exposed to tobacco smoke. She has never used smokeless tobacco. She reports that she does not drink alcohol and does not use drugs.    Family History:  Family History   Problem Relation Name Age of Onset    Hypertension Mother      Lung cancer Father          Allergies:  Patient has no known allergies.    Outpatient Medications:  Current Outpatient Medications   Medication Instructions    ARIPiprazole (ABILIFY) 10 mg, oral, Daily     baclofen (LIORESAL) 5 mg, 2 times daily    COVID [id number],,andu,,PF, (Spikevax [id number],12y up,,PF,) 50 mcg/0.5 mL suspension 0.5 mL, intramuscular, Once    cyanocobalamin (VITAMIN B-12) 1,000 mcg, intramuscular, Every 30 days    donepezil (ARICEPT) 5 mg, oral, Nightly    Eliquis 5 mg, 2 times daily    escitalopram (Lexapro) 10 mg tablet TAKE ONE TABLET BY MOUTH EVERY DAY    ferrous sulfate 65 mg, oral, 3 times daily with meals, Do not crush, chew, or split.    furosemide (LASIX) 40 mg, oral, Daily    gabapentin (NEURONTIN) 400 mg, oral, 3 times daily    ondansetron (ZOFRAN) 4 mg, Every 6 hours PRN    oxybutynin XL (Ditropan-XL) 15 mg 24 hr tablet TAKE ONE TABLET BY MOUTH EVERY DAY DO NOT CRUSH,CHEW OR SPLIT    pravastatin (Pravachol) 10 mg tablet     traMADol (Ultram) 50 mg tablet oral        Last Recorded Vitals:  Vitals:    11/15/23 0907   BP: 107/67   Pulse: 59   SpO2: 99%   Weight: 85.7 kg (189 lb)       Physical Exam:  General: well-developed, well-nourished, no distress  Head: normocephalic, atraumatic  Eyes: PERRL, anicteric sclerae, no conjunctival injection  ENT: normal oropharynx  Neck: supple, no carotid bruits, no JVD  Lungs: normal respiratory effort, clear to auscultation bilaterally  Heart: regular, normal S1 and S2, no murmurs, rubs or gallops  Abdomen: normal active bowel sounds, soft, non-distended, non-tender  Extremities: no cyanosis or clubbing  Vascular: bilateral edema right worse than left, pulses 2+ and symmetric  Musculoskeletal: no deformities  Neurological: alert and oriented, no gross neurological deficits  Psychological: normal mood and affect          Last Labs:  CBC -  Lab Results   Component Value Date    WBC 10.8 04/26/2023    HGB 11.8 (L) 04/26/2023    HCT 40.4 04/26/2023    MCV 83 04/26/2023     04/26/2023       CMP -  Lab Results   Component Value Date    CALCIUM 10.1 04/26/2023    PHOS 3.2 02/11/2023    PROT 8.8 (H) 04/26/2023    ALBUMIN 4.2 04/26/2023    AST 14  "04/26/2023    ALT 12 04/26/2023    ALKPHOS 114 04/26/2023    BILITOT 0.5 04/26/2023       LIPID PANEL -   No results found for: \"CHOL\", \"TRIG\", \"HDL\", \"CHHDL\", \"LDLF\", \"VLDL\", \"NHDL\"    RENAL FUNCTION PANEL -   Lab Results   Component Value Date    GLUCOSE 132 (H) 04/26/2023     (L) 04/26/2023    K 4.4 04/26/2023     04/26/2023    CO2 24 04/26/2023    ANIONGAP 13 04/26/2023    BUN 16 04/26/2023    CREATININE 1.57 (H) 04/26/2023    GFRMALE CANCELED 03/09/2023    CALCIUM 10.1 04/26/2023    PHOS 3.2 02/11/2023    ALBUMIN 4.2 04/26/2023        Lab Results   Component Value Date     (H) 12/09/2022    HGBA1C 5.3 08/15/2021           Assessment/Plan   Diagnoses and all orders for this visit:  Difficulty walking  -     Referral to Physical Medicine Rehab; Future  Chronic deep vein thrombosis (DVT) of right femoral vein (CMS/HCC)  Continue indefinite AC  OK to interrupt for hip surgery if needed      Yulia Leigh MD  "

## 2023-11-16 LAB
AORTIC VALVE MEAN GRADIENT: 5
AORTIC VALVE PEAK VELOCITY: 1.63
AV PEAK GRADIENT: 10.6
AVA (PEAK VEL): 2.2
AVA (VTI): 2.43
EJECTION FRACTION APICAL 4 CHAMBER: 64.2
EJECTION FRACTION: 64
LEFT ATRIUM VOLUME AREA LENGTH INDEX BSA: 22.8
LEFT VENTRICULAR OUTFLOW TRACT DIAMETER: 2
MITRAL VALVE E/A RATIO: 1.11
MITRAL VALVE E/E' RATIO: 7.18
RIGHT VENTRICLE FREE WALL PEAK S': 15.4
RIGHT VENTRICLE PEAK SYSTOLIC PRESSURE: 30.1
TRICUSPID ANNULAR PLANE SYSTOLIC EXCURSION: 3.3

## 2023-11-30 ENCOUNTER — NURSING HOME VISIT (OUTPATIENT)
Dept: POST ACUTE CARE | Facility: EXTERNAL LOCATION | Age: 73
End: 2023-11-30
Payer: MEDICARE

## 2023-11-30 DIAGNOSIS — R60.0 LEG EDEMA: ICD-10-CM

## 2023-11-30 DIAGNOSIS — F32.A DEPRESSION, UNSPECIFIED DEPRESSION TYPE: ICD-10-CM

## 2023-11-30 DIAGNOSIS — R41.3 MEMORY IMPAIRMENT: ICD-10-CM

## 2023-11-30 DIAGNOSIS — I82.4Z1 DEEP VEIN THROMBOSIS (DVT) OF DISTAL VEIN OF RIGHT LOWER EXTREMITY, UNSPECIFIED CHRONICITY (MULTI): Primary | ICD-10-CM

## 2023-11-30 DIAGNOSIS — N18.9 CHRONIC KIDNEY DISEASE, UNSPECIFIED CKD STAGE: ICD-10-CM

## 2023-11-30 DIAGNOSIS — D64.9 ANEMIA, UNSPECIFIED TYPE: ICD-10-CM

## 2023-11-30 PROCEDURE — 99308 SBSQ NF CARE LOW MDM 20: CPT | Performed by: NURSE PRACTITIONER

## 2023-11-30 NOTE — LETTER
Patient: Noemi Pérez  : 1950    Encounter Date: 2023    Patient ID: Noemi Pérez is a 72 y.o. female who is long term resident being seen and evaluated for multiple medical problems.  [id number]   1950    /72   Pulse 72   Temp 36.7 °C (98 °F)   Resp 16   Wt 79.4 kg (175 lb)   SpO2 96%   BMI 32.01 kg/m²     Assessment/Plan  Problem List Items Addressed This Visit       Depression     Lexapro 10 mg by mouth daily   Aripiprazole decreased to 5 mg by mouth daily (Bipolar)         Chronic kidney disease     Monitor renal panel  Avoid NSAIDS  2023 BUN/Creat 16/1.57  2023 BUN/Creat  20/1.1, Potassium 4.4         Anemia     Ferrous sulfate 325 mg by mouth daily   B12 1000 mcg IM monthly   2023 H/H 11.3/35         Leg edema     10/31/2023 Dr Bailey (Cardiology) apt completed   Lasix 40 mg by mouth daily   Scheduled for NAT at [facility] 11/15/2023   2023 Potassium 4.4         Deep vein thrombosis (DVT) of right lower extremity (CMS/HCC) - Primary     Apixaban 5 mg by mouth BID   Dr Quinteros (Vascular) apt [facility] completed 2023  Compression stockings   Client requested second opinion from Vascular   11/15/2023 Dr Leigh vascular apt completed   Continue Apixaban  Recommended follow up apt with Dr Lilly (Physiatrist) 2024 second to decreased mobility          Memory impairment     Aricept 5 mg by mouth every HS                HPI: Client pending apt with Dr Lilly per Vascular recommendation second to decreased mobility. Client tranfers with WC to bathroom then stands and pivots to toilet. Client attributes decreased mobility to chronic bilateral hip and left knee pain. Discussed possible neurology evaluation second to decreased mobility, however client declines at this time. Prefers to follow up with physiatrist first. Client denies HA, dizziness, or lightheadedness. Denies CP, SOB, Cough, or increased edema. RA. Denies abdominal pain, N/V, diarrhea, or constipation.  Denies dysuria. Denies change in appetite. No significant weight change.     Review of Systems ROS as described in in HPI     Physical Exam  Constitutional:       Comments: Resting in bed    HENT:      Mouth/Throat:      Mouth: Mucous membranes are moist.   Cardiovascular:      Rate and Rhythm: Normal rate.   Pulmonary:      Effort: Pulmonary effort is normal.      Comments: RA  Chest:      Chest wall: No tenderness.   Abdominal:      General: Bowel sounds are normal.   Genitourinary:     Comments: Denies dysuria   Musculoskeletal:      Cervical back: Neck supple.      Right lower leg: Edema (plus one) present.      Left lower leg: Edema (plus one) present.      Comments: Chronic bilateral hip and left knee pain    Skin:     General: Skin is warm and dry.   Neurological:      Mental Status: She is alert. Mental status is at baseline.   Psychiatric:         Mood and Affect: Mood normal.      Comments: Conversational                    Electronically Signed By: HALEIGH Mcgovern   12/1/23  8:55 AM

## 2023-12-01 VITALS
BODY MASS INDEX: 32.01 KG/M2 | OXYGEN SATURATION: 96 % | DIASTOLIC BLOOD PRESSURE: 72 MMHG | WEIGHT: 175 LBS | RESPIRATION RATE: 16 BRPM | TEMPERATURE: 98 F | HEART RATE: 72 BPM | SYSTOLIC BLOOD PRESSURE: 123 MMHG

## 2023-12-01 NOTE — ASSESSMENT & PLAN NOTE
Monitor renal panel  Avoid NSAIDS  4/26/2023 BUN/Creat 16/1.57  11/9/2023 BUN/Creat  20/1.1, Potassium 4.4

## 2023-12-01 NOTE — ASSESSMENT & PLAN NOTE
Apixaban 5 mg by mouth BID   Dr Quinteros (Vascular) apt [facility] completed 9/18/2023  Compression stockings   Client requested second opinion from Vascular   11/15/2023 Dr Leigh vascular apt completed   Continue Apixaban  Recommended follow up apt with Dr Lilly (Physiatrist) 1/11/2024 second to decreased mobility

## 2023-12-01 NOTE — ASSESSMENT & PLAN NOTE
10/31/2023 Dr Bailey (Cardiology) apt completed   Lasix 40 mg by mouth daily   Scheduled for NAT at [facility] 11/15/2023   11/9/2023 Potassium 4.4

## 2023-12-01 NOTE — PROGRESS NOTES
Patient ID: Noemi Pérez is a 72 y.o. female who is long term resident being seen and evaluated for multiple medical problems.  [id number]   1950    /72   Pulse 72   Temp 36.7 °C (98 °F)   Resp 16   Wt 79.4 kg (175 lb)   SpO2 96%   BMI 32.01 kg/m²     Assessment/Plan  Problem List Items Addressed This Visit       Depression     Lexapro 10 mg by mouth daily   Aripiprazole decreased to 5 mg by mouth daily (Bipolar)         Chronic kidney disease     Monitor renal panel  Avoid NSAIDS  4/26/2023 BUN/Creat 16/1.57  11/9/2023 BUN/Creat  20/1.1, Potassium 4.4         Anemia     Ferrous sulfate 325 mg by mouth daily   B12 1000 mcg IM monthly   11/9/2023 H/H 11.3/35         Leg edema     10/31/2023 Dr Bailey (Cardiology) apt completed   Lasix 40 mg by mouth daily   Scheduled for NAT at [facility] 11/15/2023   11/9/2023 Potassium 4.4         Deep vein thrombosis (DVT) of right lower extremity (CMS/HCC) - Primary     Apixaban 5 mg by mouth BID   Dr Quinteros (Vascular) apt [facility] completed 9/18/2023  Compression stockings   Client requested second opinion from Vascular   11/15/2023 Dr Leigh vascular apt completed   Continue Apixaban  Recommended follow up apt with Dr Lilly (Physiatrist) 1/11/2024 second to decreased mobility          Memory impairment     Aricept 5 mg by mouth every HS                HPI: Client pending apt with Dr Lilly per Vascular recommendation second to decreased mobility. Client tranfers with WC to bathroom then stands and pivots to toilet. Client attributes decreased mobility to chronic bilateral hip and left knee pain. Discussed possible neurology evaluation second to decreased mobility, however client declines at this time. Prefers to follow up with physiatrist first. Client denies HA, dizziness, or lightheadedness. Denies CP, SOB, Cough, or increased edema. RA. Denies abdominal pain, N/V, diarrhea, or constipation. Denies dysuria. Denies change in appetite. No significant weight change.      Review of Systems ROS as described in in HPI     Physical Exam  Constitutional:       Comments: Resting in bed    HENT:      Mouth/Throat:      Mouth: Mucous membranes are moist.   Cardiovascular:      Rate and Rhythm: Normal rate.   Pulmonary:      Effort: Pulmonary effort is normal.      Comments: RA  Chest:      Chest wall: No tenderness.   Abdominal:      General: Bowel sounds are normal.   Genitourinary:     Comments: Denies dysuria   Musculoskeletal:      Cervical back: Neck supple.      Right lower leg: Edema (plus one) present.      Left lower leg: Edema (plus one) present.      Comments: Chronic bilateral hip and left knee pain    Skin:     General: Skin is warm and dry.   Neurological:      Mental Status: She is alert. Mental status is at baseline.   Psychiatric:         Mood and Affect: Mood normal.      Comments: Conversational

## 2023-12-20 ENCOUNTER — TELEPHONE (OUTPATIENT)
Dept: PHYSICAL MEDICINE AND REHAB | Facility: CLINIC | Age: 73
End: 2023-12-20
Payer: MEDICARE

## 2023-12-20 NOTE — TELEPHONE ENCOUNTER
Called pt, offered 12/21 CXL (scheduled 1/4/24), pt declined. Unable to make transport arrangements.

## 2024-01-04 ENCOUNTER — TELEPHONE (OUTPATIENT)
Dept: PHYSICAL MEDICINE AND REHAB | Facility: CLINIC | Age: 74
End: 2024-01-04

## 2024-01-04 NOTE — TELEPHONE ENCOUNTER
Tried calling this phone #, seems to be a non working number.  I called pt and LVM to have her facility call to make the appt.

## 2024-01-04 NOTE — TELEPHONE ENCOUNTER
Betina from [facility] LVM, pt only told them yesterday that she has an appt today- transport was not set. Needs to r/s appt for today. Per Dr. Lilly- sherlyn as a no show.

## 2024-01-11 ENCOUNTER — NURSING HOME VISIT (OUTPATIENT)
Dept: POST ACUTE CARE | Facility: EXTERNAL LOCATION | Age: 74
End: 2024-01-11

## 2024-01-11 ENCOUNTER — APPOINTMENT (OUTPATIENT)
Dept: PHYSICAL MEDICINE AND REHAB | Facility: CLINIC | Age: 74
End: 2024-01-11
Payer: MEDICARE

## 2024-01-11 DIAGNOSIS — I82.4Z1 DEEP VEIN THROMBOSIS (DVT) OF DISTAL VEIN OF RIGHT LOWER EXTREMITY, UNSPECIFIED CHRONICITY (MULTI): Primary | ICD-10-CM

## 2024-01-11 DIAGNOSIS — R26.2 DIFFICULTY WALKING: ICD-10-CM

## 2024-01-11 DIAGNOSIS — D64.9 ANEMIA, UNSPECIFIED TYPE: ICD-10-CM

## 2024-01-11 PROCEDURE — 99308 SBSQ NF CARE LOW MDM 20: CPT | Performed by: NURSE PRACTITIONER

## 2024-01-11 NOTE — LETTER
Patient: Noemi Pérez  : 1950    Encounter Date: 2024    Patient ID: Noemi Pérez is a 73 y.o. female who is long term resident being seen and evaluated for multiple medical problems.  [id number]   1950    /73   Pulse 74   Temp 36.7 °C (98 °F)   Resp 16   Wt 79.8 kg (176 lb)   SpO2 97%   BMI 32.19 kg/m²     Assessment/Plan  Problem List Items Addressed This Visit       Difficulty walking     DR Lilly apt 2024 canceled- no transport   Staff to reschedule         Anemia     Ferrous sulfate 325 mg by mouth daily   B12 1000 mcg IM monthly   2023 H/H 11.3/35         Deep vein thrombosis (DVT) of right lower extremity (CMS/HCC) - Primary     Apixaban 5 mg by mouth BID   11/15/2023 Dr Leigh vascular apt completed   Continue Apixaban  Recommended follow up apt with Dr Lilly (Physiatrist) second to decreased mobility               HPI: Client denies HA, dizziness, or lightheadedness. Denies CP, SOB, or increased edema. RA. Denies abdominal pain, N/V, diarrhea, or constipation. Denies dysuria. Denies change in appetite. C/O chronic bilateral hip pain, more so left.     Review of Systems ROS as described in in HPI     Physical Exam  Constitutional:       Comments: Sitting in WC   HENT:      Mouth/Throat:      Mouth: Mucous membranes are moist.   Cardiovascular:      Rate and Rhythm: Normal rate.   Pulmonary:      Effort: Pulmonary effort is normal.      Breath sounds: Normal breath sounds.      Comments: RA  Abdominal:      General: Bowel sounds are normal.   Genitourinary:     Comments: Denies dysuria   Musculoskeletal:      Cervical back: Neck supple.      Right lower leg: Edema (Plus one) present.      Left lower leg: Edema (Plus one) present.      Comments: Stand and Pivot   Chronic bilateral hip pain.    Skin:     General: Skin is warm and dry.   Neurological:      Mental Status: She is alert. Mental status is at baseline.   Psychiatric:         Mood and Affect: Mood normal.       Comments: Conversational                    Electronically Signed By: HALEIGH Mcgovern   1/13/24 12:28 PM

## 2024-01-13 VITALS
SYSTOLIC BLOOD PRESSURE: 118 MMHG | BODY MASS INDEX: 32.19 KG/M2 | WEIGHT: 176 LBS | OXYGEN SATURATION: 97 % | RESPIRATION RATE: 16 BRPM | TEMPERATURE: 98 F | HEART RATE: 74 BPM | DIASTOLIC BLOOD PRESSURE: 73 MMHG

## 2024-01-13 PROBLEM — Q28.2: Status: ACTIVE | Noted: 2018-09-18

## 2024-01-13 PROBLEM — H25.13 NUCLEAR SCLEROTIC CATARACT, BILATERAL: Status: ACTIVE | Noted: 2018-01-03

## 2024-01-13 NOTE — ASSESSMENT & PLAN NOTE
Apixaban 5 mg by mouth BID   11/15/2023 Dr Leigh vascular apt completed   Continue Apixaban  Recommended follow up apt with Dr Lilly (Physiatrist) second to decreased mobility

## 2024-01-13 NOTE — ASSESSMENT & PLAN NOTE
Tramadol to 50 mg by mouth BID as needed   Acetaminophen 650 mg by mouth every 8 hrs as needed  Dr Rueda (Ortho)

## 2024-01-13 NOTE — PROGRESS NOTES
Patient ID: Noemi Pérez is a 73 y.o. female who is long term resident being seen and evaluated for multiple medical problems.  [id number]   1950    /73   Pulse 74   Temp 36.7 °C (98 °F)   Resp 16   Wt 79.8 kg (176 lb)   SpO2 97%   BMI 32.19 kg/m²     Assessment/Plan  Problem List Items Addressed This Visit       Difficulty walking     DR Lilly apt 1/4/2024 canceled- no transport   Staff to reschedule         Anemia     Ferrous sulfate 325 mg by mouth daily   B12 1000 mcg IM monthly   11/9/2023 H/H 11.3/35         Deep vein thrombosis (DVT) of right lower extremity (CMS/HCC) - Primary     Apixaban 5 mg by mouth BID   11/15/2023 Dr Leigh vascular apt completed   Continue Apixaban  Recommended follow up apt with Dr Lilly (Physiatrist) second to decreased mobility               HPI: Client denies HA, dizziness, or lightheadedness. Denies CP, SOB, or increased edema. RA. Denies abdominal pain, N/V, diarrhea, or constipation. Denies dysuria. Denies change in appetite. C/O chronic bilateral hip pain, more so left.     Review of Systems ROS as described in in HPI     Physical Exam  Constitutional:       Comments: Sitting in WC   HENT:      Mouth/Throat:      Mouth: Mucous membranes are moist.   Cardiovascular:      Rate and Rhythm: Normal rate.   Pulmonary:      Effort: Pulmonary effort is normal.      Breath sounds: Normal breath sounds.      Comments: RA  Abdominal:      General: Bowel sounds are normal.   Genitourinary:     Comments: Denies dysuria   Musculoskeletal:      Cervical back: Neck supple.      Right lower leg: Edema (Plus one) present.      Left lower leg: Edema (Plus one) present.      Comments: Stand and Pivot   Chronic bilateral hip pain.    Skin:     General: Skin is warm and dry.   Neurological:      Mental Status: She is alert. Mental status is at baseline.   Psychiatric:         Mood and Affect: Mood normal.      Comments: Conversational

## 2024-02-07 NOTE — PROGRESS NOTES
Subjective   Patient ID: Noemi Pérez is a 73 y.o. female who presents for No chief complaint on file..  HPI    Review of Systems    Objective   Physical Exam    Assessment/Plan   {Assess/PlanSmartLinks:35780}         Huyen Lilly MD 02/07/24 1:24 PM

## 2024-02-07 NOTE — PROGRESS NOTES
"Chief complaint: Difficulty walking     Dear Dr. Leigh    I had the pleasure of seeing your patient, Noemi Pérez, in clinic today.  As you know, she is a pleasant 73-year-old right-handed woman with past medical history significant for DVT on Eliquis, HLD, HTN, GERD, nephrolithiasis, CKD, depression, bipolar disorder, who presents for evaluation of difficulty walking and deconditioning.     TIMELINE OF COMPLAINT(S):    Patient has been having significant pain in bilateral hips and knees for several years, progressively with more debility over time, going from pain to walker to wheelchair bound.  She \"was supposed to have surgery last July but then they found a lot of blood clots and I was advised not to do surgery until the blood clots were treated\".    She had previous CT and MRIs of the pelvis which saw some thickened endometrium and fluid, referral to gynecology was recommended, but the patient does not think that this was done.  Upon chart review, I do not see any gynecological evaluations or pelvic ultrasounds.    She feels that her weakness is both due to pain and deconditioning.  She denies any issues in the upper extremities.  She does not have any significant low back pain.    Note from Dr. Leigh 11/15/2023 personally reviewed today.    Note from Dr. Rueda 5/23/2023 personally reviewed today.  He recommended bilateral hip replacements pending medical clearance     Pain:  LOCATION- Bilateral leg anterior thighs, lateral hips, knees , no back pain  RADIATION-  ASSOCIATED WITH- Swelling  NUMBNESS/TINGLING- Yes, calves  WEAKNESS- Yes  CONSTANT or INTERMITTENT- Intermittent, sometimes wakes up at night  SEVERITY/QUANTITY- 5  QUALITY- Burning, dull, achy, throbbing  EXACERBATED BY-  nothing  BETTER WITH- Medication  TRIED- Tramadol helps sometimes       Anti-Inflammatories: (Patient is on Eliquis and has history of CKD), previously diclofenac, meloxicam, Medrol Dosepak      Muscle relaxants: Baclofen 5 mg " "twice daily \"I think it helps\"      Anti-depressants:      Neuroleptics: Gabapentin 400 mg 3 times daily \"I think it helps\"      LDN:     PHYSICAL THERAPY: Yes, last time several months ago.  No HEP  TENS unit: No  CHIROPRACTIC MANIPULATION: Yes  ACUPUNCTURE TREATMENTS: No  DEEP TISSUE MASSAGE THERAPY: No  OSTEOPATHIC MANIPULATION THERAPY: No  INJECTIONS: L hip joint injection 2-3 years ago, helped  EMG/NCS: No     IMAGING: Yes     === 06/24/22 ===     CT ABDOMEN PELVIS W IV CONTRAST     IMPRESSION:  1. Findings of acute pancreatitis. Homogeneous pancreas without  pseudocyst or necrosis.  2. Distended gallbladder with likely gallstones.  3. Mild intrahepatic biliary dilatation.  4. Areas of cortical thinning and scarring involving the right kidney  with extrarenal right renal pelvis versus nonspecific right renal  pelvis dilatation.  5. Fluid within endometrial canal. Endometrial pathology not  excluded. Recommend a nonemergent follow-up pelvic ultrasound to  better evaluate.       === 06/04/21 ===     - Impression -  No evidence of acute intrathoracic abnormality.     Hip x-rays 4/27/2023: Severe bilateral hip OA with possible bilateral AVN.     Knee and lumbar x-rays in March of 2023 ordered but never done              Lab Results   Component Value Date     HGBA1C 5.3 08/15/2021         FUNCTIONAL HISTORY: The patient is independent in all ADLs, mobility, and driving. The patient uses WC now, last time she used a walker was a 1-2 years ago.     SH:  Lives in: [facility]  Lives with: Nursing home or assisted living for the past few years  Occupation: Retired  Tobacco: Former, quit 30 years ago  Alcohol: No  Drugs: No     ROS: The patient denies any bowel or bladder incontinence/accidents, night sweats, fevers, chills, recent significant weight loss. A 14 point review of systems was reviewed with the patient and is as above and otherwise negative.  ROS questionnaire positive for weakness, difficulty walking, " constipation, chronic urinary incontinence and frequency for the past year, joint pain, swelling, depression, difficulty with ADLs         PHYSICAL EXAM     GEN - Alert, well-developed, well-nourished, no acute distress, sitting in wheelchair  PSYCH - Cooperative, appropriate mood and affect  HEENT - NC/AT  RESP - Non-labored respirations, equal expansion  CV - warm and well-perfused, No cyanosis, mild edema in extremities.  ABD- soft, ND  SKIN - No rash.     MSK: Unable to fully test range of motion in bilateral hips and knees, but any movement, even by a few degrees in any direction of both hips and knees reproduce significant pain and she did not want me to keep going further.      NEURO - UE strength 5/5 -  including shoulder abduction, biceps, triceps, wrist extensors, finger flexors, interossei, and    LE strength -  including hip flexors (1/5 bilaterally), knee flexors (3/5 bilaterally), knee extensors (4 -/5 bilaterally), 4/5 bilaterally ankle dorsiflexors, ankle plantar flexors, and EHL   Sensation - intact to light touch in bilateral lower and upper extremities.   Reflexes -diminished but equal biceps, brachioradialis, triceps, patellar and absent Achilles reflexes bilaterally No Clonus, No Babinski, Nolasco's negative bilaterally  GAIT -deferred    IMPRESSION:     This is a pleasant 73-year-old right-handed woman with past medical history significant for DVT on Eliquis, HLD, HTN, GERD, nephrolithiasis, CKD, depression, bipolar disorder, who presents for evaluation of difficulty walking and deconditioning.  Physical exam is notable for diffuse weakness and atrophy in bilateral lower extremities.  Unclear if this is simply disuse atrophy from significant bilateral hip pain and sitting in a wheelchair for over a year or if there is a component of lumbar stenosis.  Urinary incontinence may be coming from that versus just a weakened pelvic floor.  However, the patient has had unexplained unprovoked DVTs,  and previous imaging concerning for endometrial fluid, it was recommended that she follow-up with gynecology and pelvic ultrasound, which seems like this has never happened.  I will refer her to gynecology now.     -Patient Education: Extensive time was spent educating the patient on relevant anatomy, clinical findings and imaging, as well as discussing the potential diagnoses as discussed above.    -Continue medications for now, but consider going up on baclofen.   -Given kidney disease, it is better to take gabapentin 600 mg twice a day  -Consider other medications in the future  -Lumbar x-ray, bilateral knee x-rays ordered  -Lumbar MRI ordered  -Consider injections: Knee joint injection, ultrasound-guided hip joint injections, referral for spine injections  -Referral provided to gynecology  -Do daily exercises in your room, moving your legs and hips around is much as possible  -Follow-up after above           The patient expressed understanding and agreement with the assessment and plan. Patient encouraged to contact us should they have any questions, concerns, or any changes in symptoms.      Thank you for allowing me to participate in the care of your patient.        ** Dictated with voice recognition software, please forgive any errors in grammar and/or spelling **         Addendum 3/5/2024: X-ray reports received, outside hospital, no images available:  Bilateral knees 2/29/2024: Mild arthritic degenerative changes bilaterally.    Lumbar x-ray 2/29/2024: Spondylitic changes of the lumbar spine with degenerative disc disease worse at L5-S1.

## 2024-02-07 NOTE — PATIENT INSTRUCTIONS
-Continue medications for now, but consider going up on baclofen.   -Given kidney disease, it is better to take gabapentin 600 mg twice a day  -Consider other medications in the future  -Lumbar x-ray, bilateral knee x-rays ordered  -Lumbar MRI ordered  -Consider injections: Knee joint injection, ultrasound-guided hip joint injections, referral for spine injections  -Referral provided to gynecology  -Do daily exercises in your room, moving your legs and hips around is much as possible  -Follow-up after above

## 2024-02-07 NOTE — PROGRESS NOTES
Chief complaint: Difficulty walking    Dear Dr. Leigh,    RHD or LHD    I had the pleasure of seeing your patient, Noemi Pérez, in clinic today.  As you know, she is a pleasant 73-year-old right-handed woman with past medical history significant for HLD, HTN, DVT on Coumadin, cataracts, GERD, nephrolithiasis, chronic kidney disease, depression, bipolar, who presents for evaluation of difficulty walking.    TIMELINE OF COMPLAINT(S):    Office note from Dr. Leigh 11/15/2023 personally reviewed today.  She was sent here for difficulty walking.    Note from Dr. Rueda 5/23/2023 personally reviewed today.  He recommended bilateral hip replacements if patient is medically cleared.    Pain:  LOCATION-  RADIATION-  ASSOCIATED WITH-  NUMBNESS/TINGLING-  WEAKNESS-  CONSTANT or INTERMITTENT-  SEVERITY/QUANTITY-  QUALITY-  EXACERBATED BY-  BETTER WITH-  TRIED-      Anti-Inflammatories: (Patient is on Eliquis and has history of CKD), previously meloxicam      Muscle relaxants: Baclofen 5 mg twice daily      Anti-depressants:      Neuroleptics: Gabapentin 400 mg 3 times daily      LDN:    PHYSICAL THERAPY:  TENS unit:  CHIROPRACTIC MANIPULATION:  ACUPUNCTURE TREATMENTS:  DEEP TISSUE MASSAGE THERAPY:  OSTEOPATHIC MANIPULATION THERAPY:  INJECTIONS:  EMG/NCS:    IMAGING: Images and report personally reviewed and interpreted today and discussed with the patient.    Bilateral hip x-rays 4/27/2023: Severe bilateral hip OA with no acute bony abnormality, possible bilateral AVN.    Knee x-rays 4/26/2023: Left patellofemoral osseous spurring, no knee effusion or acute fracture.        Lab Results   Component Value Date    HGBA1C 5.3 08/15/2021       FUNCTIONAL HISTORY: The patient is independent in all ADLs, mobility, and driving. The patient does not use any assistive device.    SH:  Lives in:  Lives with:  Occupation:  Tobacco:  Alcohol:  Drugs:    ROS: The patient denies any bowel or bladder incontinence/accidents, night sweats,  fevers, chills, recent significant weight loss. A 14 point review of systems was reviewed with the patient and is as above and otherwise negative.  ROS questionnaire positive for     Physical Exam     PHYSICAL EXAM    GEN - Alert, well-developed, well-nourished, no acute distress  PSYCH - Cooperative, appropriate mood and affect  HEENT - NC/AT  RESP - Non-labored respirations, equal expansion  CV - warm and well-perfused, No cyanosis or edema in extremities. DP pulses 2+ bilaterally  ABD- soft, ND  SKIN - No rash.    NECK/EXTR- Cervical ROM is full with forward flexion, extension, rotation, and side bending with no pain. Spurlings and Lhermitte's negative. No tenderness of the cervical spinous processes. No tenderness of the cervical paraspinal muscles and trapezei musculature as well as the scapular musculature. Scapulae are symmetrical without evidence of medial or lateral winging.    Shoulder ROM full with flexion, abduction, external and internal rotation. No tenderness of SC, AC, or bicipital groove. Negative Empty can test. Negative Neer's test. Negative Hawkin's test. Negative [place]. Negative Speed's test. Supraspinatus strength 5/5 bilaterally. Infraspinatus strength 5/5 bilaterally. Belly press negative bilaterally. Lift-off negative bilaterally. Negative apprehension.    BACK/SPINE - Symmetric posture, no erythema, edema, or swelling.  Mild left sided back pain with lumbar flexion. No pain with extension, rotation, or side bend. No pain with facet loading. No tenderness of lumbosacral spinous processes. Mild tenderness over the left lumbar paraspinal muscles. No tenderness over the iliolumbar ligaments bilaterally. No TTP of bilateral PSIS, sacral sulcus, gluteus medius, piriformis, greater trochanters, or IT band. Sacral compression negative bilaterally. Straight leg raise negative bilaterally. Sitting slump test negative bilaterally. Femoral stretch test negative bilaterally.     HIPS/PELVIS -  Symmetric in standing and lying Passive hip flexion, internal rotation, and external rotation within functional normal limits bilaterally without provocation of pain symptoms. No tenderness over iliopsoas tendon.Negative FABERs bilaterally. Negative hip clicking. hip Negative hip impingement test. Negative log roll. Negative resisted active SLR. No pain with deep hip flexion.Negative single leg hop test. Pan test negative.     Knee: No warmth, effusion, or erythema.  No popliteal fullness.  No anterior, posterior, medial or lateral instability.  No pes anserine tenderness.  There is no pain with hyperflexion of the knee.  There is no pain with varus or valgus stressing of the knee during flexion and extension.  There is no crepitus.  There is no medial or lateral joint line tenderness.    NEURO - UE strength 5/5 -  including shoulder abduction, biceps, triceps, wrist extensors, finger flexors, interossei, and    LE strength 5/5 -  including hip flexors, knee flexors, knee extensors, ankle dorsiflexors, ankle plantar flexors, and EHL   Sensation - intact to light touch in bilateral lower extremities.   Reflexes - 2+ biceps, brachioradialis, triceps, patellar and Achilles reflexes bilaterally No Clonus, No Babinski, Nolasco's negative bilaterally  GAIT - Normal base, normal stride length, non-antalgic. Able to toe walk and heel walk without difficulty. Able to perform tandem gait without difficulty.    IMPRESSION:    This is a pleasant 73-year-old right-handed woman with past medical history significant for HLD, HTN, DVT on Coumadin, cataracts, GERD, nephrolithiasis, chronic kidney disease, depression, bipolar, who presents for evaluation of difficulty walking.      -Patient Education: Extensive time was spent educating the patient on relevant anatomy, clinical findings and imaging, as well as discussing the potential diagnoses as discussed above.           The patient expressed understanding and agreement with  the assessment and plan. Patient encouraged to contact us should they have any questions, concerns, or any changes in symptoms.     Thank you for allowing me to participate in the care of your patient.      ** Dictated with voice recognition software, please forgive any errors in grammar and/or spelling **

## 2024-02-08 ENCOUNTER — OFFICE VISIT (OUTPATIENT)
Dept: PHYSICAL MEDICINE AND REHAB | Facility: CLINIC | Age: 74
End: 2024-02-08
Payer: MEDICARE

## 2024-02-08 ENCOUNTER — APPOINTMENT (OUTPATIENT)
Dept: PHYSICAL MEDICINE AND REHAB | Facility: CLINIC | Age: 74
End: 2024-02-08
Payer: MEDICARE

## 2024-02-08 VITALS
SYSTOLIC BLOOD PRESSURE: 111 MMHG | BODY MASS INDEX: 32.19 KG/M2 | OXYGEN SATURATION: 97 % | TEMPERATURE: 96.8 F | HEART RATE: 64 BPM | DIASTOLIC BLOOD PRESSURE: 62 MMHG | HEIGHT: 62 IN

## 2024-02-08 DIAGNOSIS — R26.2 DIFFICULTY WALKING: ICD-10-CM

## 2024-02-08 DIAGNOSIS — M16.0 OSTEOARTHRITIS OF BOTH HIPS, UNSPECIFIED OSTEOARTHRITIS TYPE: ICD-10-CM

## 2024-02-08 DIAGNOSIS — M25.552 BILATERAL HIP PAIN: ICD-10-CM

## 2024-02-08 DIAGNOSIS — G62.9 NEUROPATHY: ICD-10-CM

## 2024-02-08 DIAGNOSIS — R29.898 WEAKNESS OF BOTH LOWER EXTREMITIES: Primary | ICD-10-CM

## 2024-02-08 DIAGNOSIS — M25.562 CHRONIC PAIN OF BOTH KNEES: ICD-10-CM

## 2024-02-08 DIAGNOSIS — R32 URINARY INCONTINENCE, UNSPECIFIED TYPE: ICD-10-CM

## 2024-02-08 DIAGNOSIS — M62.838 MUSCLE SPASM: ICD-10-CM

## 2024-02-08 DIAGNOSIS — M25.551 BILATERAL HIP PAIN: ICD-10-CM

## 2024-02-08 DIAGNOSIS — G89.29 CHRONIC PAIN OF BOTH KNEES: ICD-10-CM

## 2024-02-08 DIAGNOSIS — R93.89 THICKENED ENDOMETRIUM: ICD-10-CM

## 2024-02-08 DIAGNOSIS — R53.81 PHYSICAL DECONDITIONING: ICD-10-CM

## 2024-02-08 DIAGNOSIS — M16.0 BILATERAL HIP JOINT ARTHRITIS: ICD-10-CM

## 2024-02-08 DIAGNOSIS — M25.561 CHRONIC PAIN OF BOTH KNEES: ICD-10-CM

## 2024-02-08 PROCEDURE — 1036F TOBACCO NON-USER: CPT | Performed by: PHYSICAL MEDICINE & REHABILITATION

## 2024-02-08 PROCEDURE — 1125F AMNT PAIN NOTED PAIN PRSNT: CPT | Performed by: PHYSICAL MEDICINE & REHABILITATION

## 2024-02-08 PROCEDURE — 3074F SYST BP LT 130 MM HG: CPT | Performed by: PHYSICAL MEDICINE & REHABILITATION

## 2024-02-08 PROCEDURE — 3008F BODY MASS INDEX DOCD: CPT | Performed by: PHYSICAL MEDICINE & REHABILITATION

## 2024-02-08 PROCEDURE — 3078F DIAST BP <80 MM HG: CPT | Performed by: PHYSICAL MEDICINE & REHABILITATION

## 2024-02-08 PROCEDURE — 99205 OFFICE O/P NEW HI 60 MIN: CPT | Performed by: PHYSICAL MEDICINE & REHABILITATION

## 2024-02-08 ASSESSMENT — PAIN SCALES - GENERAL: PAINLEVEL: 5

## 2024-02-08 NOTE — LETTER
"February 8, 2024     Chen Webber, APRN-CNP  [address] [facility]  [place] [address]    Patient: Noemi Pérez   YOB: 1950   Date of Visit: 2/8/2024       Dear Dr. Chen Webber, APRN-CNP:    Thank you for referring Noemi Pérez to me for evaluation. Below are my notes for this consultation.  If you have questions, please do not hesitate to call me. I look forward to following your patient along with you.       Sincerely,     Huyen Lilly MD      CC: MD Beth Johnson, DO  ______________________________________________________________________________________    Chief complaint: Difficulty walking     Dear Dr. Leigh    I had the pleasure of seeing your patient, Noemi Pérez, in clinic today.  As you know, she is a pleasant 73-year-old right-handed woman with past medical history significant for DVT on Eliquis, HLD, HTN, GERD, nephrolithiasis, CKD, depression, bipolar disorder, who presents for evaluation of difficulty walking and deconditioning.     TIMELINE OF COMPLAINT(S):    Patient has been having significant pain in bilateral hips and knees for several years, progressively with more debility over time, going from pain to walker to wheelchair bound.  She \"was supposed to have surgery last July but then they found a lot of blood clots and I was advised not to do surgery until the blood clots were treated\".    She had previous CT and MRIs of the pelvis which saw some thickened endometrium and fluid, referral to gynecology was recommended, but the patient does not think that this was done.  Upon chart review, I do not see any gynecological evaluations or pelvic ultrasounds.    She feels that her weakness is both due to pain and deconditioning.  She denies any issues in the upper extremities.  She does not have any significant low back pain.    Note from Dr. Leigh 11/15/2023 personally reviewed today.    Note from Dr. Rueda 5/23/2023 personally reviewed today.  He " "recommended bilateral hip replacements pending medical clearance     Pain:  LOCATION- Bilateral leg anterior thighs, lateral hips, knees , no back pain  RADIATION-  ASSOCIATED WITH- Swelling  NUMBNESS/TINGLING- Yes, calves  WEAKNESS- Yes  CONSTANT or INTERMITTENT- Intermittent, sometimes wakes up at night  SEVERITY/QUANTITY- 5  QUALITY- Burning, dull, achy, throbbing  EXACERBATED BY-  nothing  BETTER WITH- Medication  TRIED- Tramadol helps sometimes       Anti-Inflammatories: (Patient is on Eliquis and has history of CKD), previously diclofenac, meloxicam, Medrol Dosepak      Muscle relaxants: Baclofen 5 mg twice daily \"I think it helps\"      Anti-depressants:      Neuroleptics: Gabapentin 400 mg 3 times daily \"I think it helps\"      LDN:     PHYSICAL THERAPY: Yes, last time several months ago.  No HEP  TENS unit: No  CHIROPRACTIC MANIPULATION: Yes  ACUPUNCTURE TREATMENTS: No  DEEP TISSUE MASSAGE THERAPY: No  OSTEOPATHIC MANIPULATION THERAPY: No  INJECTIONS: L hip joint injection 2-3 years ago, helped  EMG/NCS: No     IMAGING: Yes     === 06/24/22 ===     CT ABDOMEN PELVIS W IV CONTRAST     IMPRESSION:  1. Findings of acute pancreatitis. Homogeneous pancreas without  pseudocyst or necrosis.  2. Distended gallbladder with likely gallstones.  3. Mild intrahepatic biliary dilatation.  4. Areas of cortical thinning and scarring involving the right kidney  with extrarenal right renal pelvis versus nonspecific right renal  pelvis dilatation.  5. Fluid within endometrial canal. Endometrial pathology not  excluded. Recommend a nonemergent follow-up pelvic ultrasound to  better evaluate.       === 06/04/21 ===     - Impression -  No evidence of acute intrathoracic abnormality.     Hip x-rays 4/27/2023: Severe bilateral hip OA with possible bilateral AVN.     Knee and lumbar x-rays in March of 2023 ordered but never done              Lab Results   Component Value Date     HGBA1C 5.3 08/15/2021         FUNCTIONAL HISTORY: The " patient is independent in all ADLs, mobility, and driving. The patient uses WC now, last time she used a walker was a 1-2 years ago.     SH:  Lives in: [place]  Lives with: Nursing home or assisted living for the past few years  Occupation: Retired  Tobacco: Former, quit 30 years ago  Alcohol: No  Drugs: No     ROS: The patient denies any bowel or bladder incontinence/accidents, night sweats, fevers, chills, recent significant weight loss. A 14 point review of systems was reviewed with the patient and is as above and otherwise negative.  ROS questionnaire positive for weakness, difficulty walking, constipation, chronic urinary incontinence and frequency for the past year, joint pain, swelling, depression, difficulty with ADLs         PHYSICAL EXAM     GEN - Alert, well-developed, well-nourished, no acute distress, sitting in wheelchair  PSYCH - Cooperative, appropriate mood and affect  HEENT - NC/AT  RESP - Non-labored respirations, equal expansion  CV - warm and well-perfused, No cyanosis, mild edema in extremities.  ABD- soft, ND  SKIN - No rash.     MSK: Unable to fully test range of motion in bilateral hips and knees, but any movement, even by a few degrees in any direction of both hips and knees reproduce significant pain and she did not want me to keep going further.      NEURO - UE strength 5/5 -  including shoulder abduction, biceps, triceps, wrist extensors, finger flexors, interossei, and    LE strength -  including hip flexors (1/5 bilaterally), knee flexors (3/5 bilaterally), knee extensors (4 -/5 bilaterally), 4/5 bilaterally ankle dorsiflexors, ankle plantar flexors, and EHL   Sensation - intact to light touch in bilateral lower and upper extremities.   Reflexes -diminished but equal biceps, brachioradialis, triceps, patellar and absent Achilles reflexes bilaterally No Clonus, No Babinski, Nolasco's negative bilaterally  GAIT -deferred    IMPRESSION:     This is a pleasant 73-year-old right-handed  woman with past medical history significant for DVT on Eliquis, HLD, HTN, GERD, nephrolithiasis, CKD, depression, bipolar disorder, who presents for evaluation of difficulty walking and deconditioning.  Physical exam is notable for diffuse weakness and atrophy in bilateral lower extremities.  Unclear if this is simply disuse atrophy from significant bilateral hip pain and sitting in a wheelchair for over a year or if there is a component of lumbar stenosis.  Urinary incontinence may be coming from that versus just a weakened pelvic floor.  However, the patient has had unexplained unprovoked DVTs, and previous imaging concerning for endometrial fluid, it was recommended that she follow-up with gynecology and pelvic ultrasound, which seems like this has never happened.  I will refer her to gynecology now.     -Patient Education: Extensive time was spent educating the patient on relevant anatomy, clinical findings and imaging, as well as discussing the potential diagnoses as discussed above.    -Continue medications for now, but consider going up on baclofen.   -Given kidney disease, it is better to take gabapentin 600 mg twice a day  -Consider other medications in the future  -Lumbar x-ray, bilateral knee x-rays ordered  -Lumbar MRI ordered  -Consider injections: Knee joint injection, ultrasound-guided hip joint injections, referral for spine injections  -Referral provided to gynecology  -Do daily exercises in your room, moving your legs and hips around is much as possible  -Follow-up after above           The patient expressed understanding and agreement with the assessment and plan. Patient encouraged to contact us should they have any questions, concerns, or any changes in symptoms.      Thank you for allowing me to participate in the care of your patient.        ** Dictated with voice recognition software, please forgive any errors in grammar and/or spelling **

## 2024-02-15 ENCOUNTER — NURSING HOME VISIT (OUTPATIENT)
Dept: POST ACUTE CARE | Facility: EXTERNAL LOCATION | Age: 74
End: 2024-02-15
Payer: MEDICARE

## 2024-02-15 DIAGNOSIS — F32.A DEPRESSION, UNSPECIFIED DEPRESSION TYPE: ICD-10-CM

## 2024-02-15 DIAGNOSIS — K59.00 CONSTIPATION, UNSPECIFIED CONSTIPATION TYPE: ICD-10-CM

## 2024-02-15 DIAGNOSIS — M25.551 BILATERAL HIP PAIN: ICD-10-CM

## 2024-02-15 DIAGNOSIS — M25.552 BILATERAL HIP PAIN: ICD-10-CM

## 2024-02-15 DIAGNOSIS — I82.4Z1 DEEP VEIN THROMBOSIS (DVT) OF DISTAL VEIN OF RIGHT LOWER EXTREMITY, UNSPECIFIED CHRONICITY (MULTI): Primary | ICD-10-CM

## 2024-02-15 DIAGNOSIS — M62.838 MUSCLE SPASM: ICD-10-CM

## 2024-02-15 PROCEDURE — 99308 SBSQ NF CARE LOW MDM 20: CPT | Performed by: NURSE PRACTITIONER

## 2024-02-15 NOTE — LETTER
Patient: Noemi Pérez  : 1950    Encounter Date: 02/15/2024    Patient ID: Noemi Pérez is a 73 y.o. female who is long term resident being seen and evaluated for multiple medical problems.  [id number]   1950    /76   Pulse 74   Temp 36.6 °C (97.8 °F)   Resp 15   Wt 79.4 kg (175 lb)   SpO2 97%   BMI 32.01 kg/m²     Assessment/Plan  Problem List Items Addressed This Visit       Depression     Lexapro 10 mg by mouth daily   Aripiprazole 5 mg by mouth daily (Bipolar)         Constipation     Colace 100 mg by mouth BID  MiraLax 17 grams by mouth daily as needed  MOM 30 ml by mouth daily as needed         Bilateral hip pain     Acetaminophen 650 mg by mouth every 8 hrs as needed  Tramadol 50 mg  by mouth every 8 hrs as needed          Muscle spasm     BLE/Back  Baclofen 5 mg by mouth BID          Deep vein thrombosis (DVT) of right lower extremity (CMS/HCC) - Primary     Apixaban 5 mg by mouth BID   11/15/2023 Dr Leigh vascular apt completed   Follow up apt 5/15/2024              HPI: Client denies HA, dizziness, or lightheadedness. Denies CP, SOB, or increased edema. RA. Denies abdominal pain, N/V, diarrhea, or constipation. Denies dysuria. Denies change in appetite. No significant weight change. C/O chronic hip pain.     Review of Systems ROS as described in HPI     Physical Exam  Constitutional:       Comments: Resting in bed    HENT:      Mouth/Throat:      Mouth: Mucous membranes are moist.   Cardiovascular:      Rate and Rhythm: Normal rate.   Pulmonary:      Effort: Pulmonary effort is normal.      Comments: RA  Abdominal:      General: Bowel sounds are normal.   Genitourinary:     Comments: Denies dysuria   Musculoskeletal:      Cervical back: Neck supple.      Comments: Slight BLE edema    Skin:     General: Skin is warm and dry.   Neurological:      Mental Status: She is alert. Mental status is at baseline.   Psychiatric:         Mood and Affect: Mood normal.      Comments:  Conversational                      Electronically Signed By: CYNDEE Mcgovern-CNP   2/17/24  1:57 PM

## 2024-02-17 VITALS
HEART RATE: 74 BPM | BODY MASS INDEX: 32.01 KG/M2 | TEMPERATURE: 97.8 F | RESPIRATION RATE: 15 BRPM | OXYGEN SATURATION: 97 % | DIASTOLIC BLOOD PRESSURE: 76 MMHG | SYSTOLIC BLOOD PRESSURE: 128 MMHG | WEIGHT: 175 LBS

## 2024-02-17 LAB
ATRIAL RATE: 49 BPM
P AXIS: 39 DEGREES
P OFFSET: 175 MS
P ONSET: 141 MS
PR INTERVAL: 158 MS
Q ONSET: 220 MS
QRS COUNT: 9 BEATS
QRS DURATION: 90 MS
QT INTERVAL: 424 MS
QTC CALCULATION(BAZETT): 383 MS
QTC FREDERICIA: 396 MS
R AXIS: 2 DEGREES
T AXIS: 9 DEGREES
T OFFSET: 432 MS
VENTRICULAR RATE: 49 BPM

## 2024-02-17 NOTE — ASSESSMENT & PLAN NOTE
Colace 100 mg by mouth BID  MiraLax 17 grams by mouth daily as needed  MOM 30 ml by mouth daily as needed

## 2024-02-17 NOTE — ASSESSMENT & PLAN NOTE
Acetaminophen 650 mg by mouth every 8 hrs as needed  Tramadol 50 mg  by mouth every 8 hrs as needed

## 2024-02-17 NOTE — PROGRESS NOTES
Patient ID: Noemi Pérez is a 73 y.o. female who is long term resident being seen and evaluated for multiple medical problems.  [id number]   1950    /76   Pulse 74   Temp 36.6 °C (97.8 °F)   Resp 15   Wt 79.4 kg (175 lb)   SpO2 97%   BMI 32.01 kg/m²     Assessment/Plan  Problem List Items Addressed This Visit       Depression     Lexapro 10 mg by mouth daily   Aripiprazole 5 mg by mouth daily (Bipolar)         Constipation     Colace 100 mg by mouth BID  MiraLax 17 grams by mouth daily as needed  MOM 30 ml by mouth daily as needed         Bilateral hip pain     Acetaminophen 650 mg by mouth every 8 hrs as needed  Tramadol 50 mg  by mouth every 8 hrs as needed          Muscle spasm     BLE/Back  Baclofen 5 mg by mouth BID          Deep vein thrombosis (DVT) of right lower extremity (CMS/HCC) - Primary     Apixaban 5 mg by mouth BID   11/15/2023 Dr Leigh vascular apt completed   Follow up apt 5/15/2024              HPI: Client denies HA, dizziness, or lightheadedness. Denies CP, SOB, or increased edema. RA. Denies abdominal pain, N/V, diarrhea, or constipation. Denies dysuria. Denies change in appetite. No significant weight change. C/O chronic hip pain.     Review of Systems ROS as described in HPI     Physical Exam  Constitutional:       Comments: Resting in bed    HENT:      Mouth/Throat:      Mouth: Mucous membranes are moist.   Cardiovascular:      Rate and Rhythm: Normal rate.   Pulmonary:      Effort: Pulmonary effort is normal.      Comments: RA  Abdominal:      General: Bowel sounds are normal.   Genitourinary:     Comments: Denies dysuria   Musculoskeletal:      Cervical back: Neck supple.      Comments: Slight BLE edema    Skin:     General: Skin is warm and dry.   Neurological:      Mental Status: She is alert. Mental status is at baseline.   Psychiatric:         Mood and Affect: Mood normal.      Comments: Conversational

## 2024-02-29 ENCOUNTER — NURSING HOME VISIT (OUTPATIENT)
Dept: POST ACUTE CARE | Facility: EXTERNAL LOCATION | Age: 74
End: 2024-02-29
Payer: MEDICARE

## 2024-02-29 DIAGNOSIS — K92.1 HEMATOCHEZIA: Primary | ICD-10-CM

## 2024-02-29 DIAGNOSIS — I82.4Z1 DEEP VEIN THROMBOSIS (DVT) OF DISTAL VEIN OF RIGHT LOWER EXTREMITY, UNSPECIFIED CHRONICITY (MULTI): ICD-10-CM

## 2024-02-29 PROCEDURE — 99308 SBSQ NF CARE LOW MDM 20: CPT | Performed by: NURSE PRACTITIONER

## 2024-02-29 NOTE — LETTER
Patient: Noemi Pérez  : 1950    Encounter Date: 2024    Patient ID: Noemi Pérez is a 73 y.o. female who is long term resident being seen and evaluated for multiple medical problems.  [id number]   1950    /68   Pulse 80   Temp 36.6 °C (97.8 °F)   Resp 16   Wt 79.4 kg (175 lb)   SpO2 97%   BMI 32.01 kg/m²     Assessment/Plan  Problem List Items Addressed This Visit       Deep vein thrombosis (DVT) of right lower extremity (CMS/HCC)     Apixaban 5 mg by mouth BID   11/15/2023 Dr Leigh vascular apt completed   Follow up apt 5/15/2024         Hematochezia - Primary     CBC with diff and BMP in the am  H/O hemorrhoidectomy  H/O anal fistula x 2  (Seton intact x 2)   Schedule an apt with Dr Villalobos (Surgeon) for evaluation of site                 HPI: Staff report client had small amount of red blood present after moving bowels. Client appears to have two anal fistulas with seton x 2 in place. Scant amount of red blood noted. Staff to schedule follow up apt with surgeon for evaluation of area. Client denies HA, dizziness, or lightheadedness. Denies CP, SOB, Cough, or increased edema. RA. Denies abdominal pain, N/V, diarrhea, or constipation. Denies dysuria. Denies change in appetite. She C/O chronic bilateral hip and knee pain.     Review of Systems ROS as described in HPI, poor historian      Physical Exam  Constitutional:       Comments: Resting in bed    HENT:      Mouth/Throat:      Mouth: Mucous membranes are moist.   Cardiovascular:      Rate and Rhythm: Normal rate.   Pulmonary:      Effort: Pulmonary effort is normal.      Breath sounds: Normal breath sounds.      Comments: RA  Abdominal:      General: Bowel sounds are normal.   Genitourinary:     Comments: Denies dysuria   Musculoskeletal:      Comments:  transfers   Stand and Pivot    Skin:     General: Skin is warm and dry.   Neurological:      Mental Status: She is alert. Mental status is at baseline.   Psychiatric:          Mood and Affect: Mood normal.      Comments: Cooperative and follows direction                    Electronically Signed By: HALEIGH Mcgovern   3/2/24  1:21 PM

## 2024-03-02 VITALS
WEIGHT: 175 LBS | BODY MASS INDEX: 32.01 KG/M2 | TEMPERATURE: 97.8 F | SYSTOLIC BLOOD PRESSURE: 128 MMHG | OXYGEN SATURATION: 97 % | HEART RATE: 80 BPM | DIASTOLIC BLOOD PRESSURE: 68 MMHG | RESPIRATION RATE: 16 BRPM

## 2024-03-02 PROBLEM — K92.1 HEMATOCHEZIA: Status: ACTIVE | Noted: 2024-03-02

## 2024-03-02 PROBLEM — R93.89 THICKENED ENDOMETRIUM: Status: RESOLVED | Noted: 2024-02-08 | Resolved: 2024-03-02

## 2024-03-02 NOTE — ASSESSMENT & PLAN NOTE
CBC with diff and BMP in the am  H/O hemorrhoidectomy  H/O anal fistula x 2  (Seton intact x 2)   Schedule an apt with Dr Villalobos (Surgeon) for evaluation of site

## 2024-03-02 NOTE — PROGRESS NOTES
Patient ID: Noemi Pérez is a 73 y.o. female who is long term resident being seen and evaluated for multiple medical problems.  [id number]   1950    /68   Pulse 80   Temp 36.6 °C (97.8 °F)   Resp 16   Wt 79.4 kg (175 lb)   SpO2 97%   BMI 32.01 kg/m²     Assessment/Plan  Problem List Items Addressed This Visit       Deep vein thrombosis (DVT) of right lower extremity (CMS/HCC)     Apixaban 5 mg by mouth BID   11/15/2023 Dr Leigh vascular apt completed   Follow up apt 5/15/2024         Hematochezia - Primary     CBC with diff and BMP in the am  H/O hemorrhoidectomy  H/O anal fistula x 2  (Seton intact x 2)   Schedule an apt with Dr Villalobos (Surgeon) for evaluation of site                 HPI: Staff report client had small amount of red blood present after moving bowels. Client appears to have two anal fistulas with seton x 2 in place. Scant amount of red blood noted. Staff to schedule follow up apt with surgeon for evaluation of area. Client denies HA, dizziness, or lightheadedness. Denies CP, SOB, Cough, or increased edema. RA. Denies abdominal pain, N/V, diarrhea, or constipation. Denies dysuria. Denies change in appetite. She C/O chronic bilateral hip and knee pain.     Review of Systems ROS as described in HPI, poor historian      Physical Exam  Constitutional:       Comments: Resting in bed    HENT:      Mouth/Throat:      Mouth: Mucous membranes are moist.   Cardiovascular:      Rate and Rhythm: Normal rate.   Pulmonary:      Effort: Pulmonary effort is normal.      Breath sounds: Normal breath sounds.      Comments: RA  Abdominal:      General: Bowel sounds are normal.   Genitourinary:     Comments: Denies dysuria   Musculoskeletal:      Comments:  transfers   Stand and Pivot    Skin:     General: Skin is warm and dry.   Neurological:      Mental Status: She is alert. Mental status is at baseline.   Psychiatric:         Mood and Affect: Mood normal.      Comments: Cooperative and follows  direction

## 2024-03-05 ENCOUNTER — TELEPHONE (OUTPATIENT)
Dept: PHYSICAL MEDICINE AND REHAB | Facility: CLINIC | Age: 74
End: 2024-03-05
Payer: MEDICARE

## 2024-03-05 NOTE — TELEPHONE ENCOUNTER
----- Message from Huyen Lilly MD sent at 3/5/2024  9:22 AM EST -----  Please let this patient know that I got the results of her x-rays.  I only saw the report, not the images themselves, but the report states that she had mild arthritis in both knees, and moderate arthritis in several levels of her lower back especially L5-S1.  She should continue with our plan and I will see her after the MRI.  Thank you

## 2024-03-07 ENCOUNTER — NURSING HOME VISIT (OUTPATIENT)
Dept: POST ACUTE CARE | Facility: EXTERNAL LOCATION | Age: 74
End: 2024-03-07
Payer: MEDICARE

## 2024-03-07 DIAGNOSIS — D64.9 ANEMIA, UNSPECIFIED TYPE: ICD-10-CM

## 2024-03-07 DIAGNOSIS — I82.4Z1 DEEP VEIN THROMBOSIS (DVT) OF DISTAL VEIN OF RIGHT LOWER EXTREMITY, UNSPECIFIED CHRONICITY (MULTI): Primary | ICD-10-CM

## 2024-03-07 DIAGNOSIS — F31.9 BIPOLAR AFFECTIVE DISORDER, REMISSION STATUS UNSPECIFIED (MULTI): ICD-10-CM

## 2024-03-07 DIAGNOSIS — K92.1 HEMATOCHEZIA: ICD-10-CM

## 2024-03-07 DIAGNOSIS — R60.0 LEG EDEMA: ICD-10-CM

## 2024-03-07 PROCEDURE — 99308 SBSQ NF CARE LOW MDM 20: CPT | Performed by: NURSE PRACTITIONER

## 2024-03-07 NOTE — LETTER
Patient: Noemi Pérez  : 1950    Encounter Date: 2024    Patient ID: Noemi Pérez is a 73 y.o. female who is long term resident being seen and evaluated for multiple medical problems.  [id number]   1950    /68   Pulse 80   Temp 36.1 °C (97 °F)   Resp 15   SpO2 97%     Assessment/Plan  Problem List Items Addressed This Visit       Anemia     Ferrous sulfate 325 mg by mouth daily   B12 1000 mcg IM monthly   2023 H/H 11.3/35  3/4/2024 H/H 11.9/36.6         Bipolar disorder (CMS/HCC)     Aripiprazole 0.5 mg by mouth daily          Leg edema     10/31/2023 Dr Bailey (Cardiology) apt completed   Lasix 40 mg by mouth daily   NAT at [facility] 11/15/2023   3/4/2024 : BUN/Creat 23/1.1, Potassium 3.7         Deep vein thrombosis (DVT) of right lower extremity (CMS/HCC) - Primary     Apixaban 5 mg by mouth BID   11/15/2023 Dr Leigh vascular apt completed   Follow up apt 5/15/2024         Hematochezia     3/4/2024 H/H 11.9/36.6  H/O hemorrhoidectomy  H/O anal fistula x 2  (Seton intact x 2)   Apt with Dr Villalobos (Surgeon) for evaluation of site                  HPI: No further reported episodes of hematochezia. Staff report client declines upcoming specialist apt. Discussed importance of attending upcoming GYN apt (Endometrial thickening) and Dr Villalobos apt (anal fistulas) with client. Client denies HA, dizziness, or lightheadedness. Denies CP, SOB, or increased edema. RA. Denies abdominal pain, N/V, diarrhea, or constipation. Denies dysuria. Denies change in appetite. C/O chronic bilateral hip and left knee pain.     Review of Systems ROS as described in HPI     Physical Exam  Constitutional:       Comments: Resting in bed    HENT:      Mouth/Throat:      Mouth: Mucous membranes are moist.   Cardiovascular:      Rate and Rhythm: Normal rate.   Pulmonary:      Effort: Pulmonary effort is normal.      Comments: RA  Abdominal:      General: Bowel sounds are normal.   Genitourinary:     Comments:  Denies dysuria   Musculoskeletal:      Comments: WC transfers   OA   Skin:     General: Skin is warm and dry.   Neurological:      Mental Status: She is alert. Mental status is at baseline.   Psychiatric:         Mood and Affect: Mood normal.      Comments: Conversational                    Electronically Signed By: HALEIGH Mcgovern   3/10/24  7:13 AM

## 2024-03-10 VITALS
OXYGEN SATURATION: 97 % | TEMPERATURE: 97 F | HEART RATE: 80 BPM | SYSTOLIC BLOOD PRESSURE: 128 MMHG | DIASTOLIC BLOOD PRESSURE: 68 MMHG | RESPIRATION RATE: 15 BRPM

## 2024-03-10 NOTE — ASSESSMENT & PLAN NOTE
3/4/2024 H/H 11.9/36.6  H/O hemorrhoidectomy  H/O anal fistula x 2  (Seton intact x 2)   Apt with Dr Villalobos (Surgeon) for evaluation of site

## 2024-03-10 NOTE — PROGRESS NOTES
Patient ID: Noemi Pérez is a 73 y.o. female who is long term resident being seen and evaluated for multiple medical problems.  [id number]   1950    /68   Pulse 80   Temp 36.1 °C (97 °F)   Resp 15   SpO2 97%     Assessment/Plan  Problem List Items Addressed This Visit       Anemia     Ferrous sulfate 325 mg by mouth daily   B12 1000 mcg IM monthly   11/9/2023 H/H 11.3/35  3/4/2024 H/H 11.9/36.6         Bipolar disorder (CMS/HCC)     Aripiprazole 0.5 mg by mouth daily          Leg edema     10/31/2023 Dr Bailey (Cardiology) apt completed   Lasix 40 mg by mouth daily   NAT at [facility] 11/15/2023   3/4/2024 : BUN/Creat 23/1.1, Potassium 3.7         Deep vein thrombosis (DVT) of right lower extremity (CMS/HCC) - Primary     Apixaban 5 mg by mouth BID   11/15/2023 Dr Leigh vascular apt completed   Follow up apt 5/15/2024         Hematochezia     3/4/2024 H/H 11.9/36.6  H/O hemorrhoidectomy  H/O anal fistula x 2  (Seton intact x 2)   Apt with Dr Villalobos (Surgeon) for evaluation of site                  HPI: No further reported episodes of hematochezia. Staff report client declines upcoming specialist apt. Discussed importance of attending upcoming GYN apt (Endometrial thickening) and Dr Villalobos apt (anal fistulas) with client. Client denies HA, dizziness, or lightheadedness. Denies CP, SOB, or increased edema. RA. Denies abdominal pain, N/V, diarrhea, or constipation. Denies dysuria. Denies change in appetite. C/O chronic bilateral hip and left knee pain.     Review of Systems ROS as described in HPI     Physical Exam  Constitutional:       Comments: Resting in bed    HENT:      Mouth/Throat:      Mouth: Mucous membranes are moist.   Cardiovascular:      Rate and Rhythm: Normal rate.   Pulmonary:      Effort: Pulmonary effort is normal.      Comments: RA  Abdominal:      General: Bowel sounds are normal.   Genitourinary:     Comments: Denies dysuria   Musculoskeletal:      Comments: WC transfers   OA   Skin:      General: Skin is warm and dry.   Neurological:      Mental Status: She is alert. Mental status is at baseline.   Psychiatric:         Mood and Affect: Mood normal.      Comments: Conversational

## 2024-03-10 NOTE — ASSESSMENT & PLAN NOTE
Ferrous sulfate 325 mg by mouth daily   B12 1000 mcg IM monthly   11/9/2023 H/H 11.3/35  3/4/2024 H/H 11.9/36.6

## 2024-03-10 NOTE — ASSESSMENT & PLAN NOTE
10/31/2023 Dr Bailey (Cardiology) apt completed   Lasix 40 mg by mouth daily   NAT at [facility] 11/15/2023   3/4/2024 : BUN/Creat 23/1.1, Potassium 3.7

## 2024-03-29 ENCOUNTER — HOSPITAL ENCOUNTER (OUTPATIENT)
Dept: RADIOLOGY | Facility: HOSPITAL | Age: 74
Discharge: HOME | End: 2024-03-29
Payer: MEDICARE

## 2024-03-29 DIAGNOSIS — R93.89 THICKENED ENDOMETRIUM: ICD-10-CM

## 2024-03-29 DIAGNOSIS — G62.9 NEUROPATHY: ICD-10-CM

## 2024-03-29 DIAGNOSIS — R53.81 PHYSICAL DECONDITIONING: ICD-10-CM

## 2024-03-29 DIAGNOSIS — R32 URINARY INCONTINENCE, UNSPECIFIED TYPE: ICD-10-CM

## 2024-03-29 DIAGNOSIS — R26.2 DIFFICULTY WALKING: ICD-10-CM

## 2024-03-29 DIAGNOSIS — R29.898 WEAKNESS OF BOTH LOWER EXTREMITIES: ICD-10-CM

## 2024-03-29 PROCEDURE — 72148 MRI LUMBAR SPINE W/O DYE: CPT | Performed by: RADIOLOGY

## 2024-03-29 PROCEDURE — 72148 MRI LUMBAR SPINE W/O DYE: CPT

## 2024-04-01 ENCOUNTER — TELEPHONE (OUTPATIENT)
Dept: PHYSICAL MEDICINE AND REHAB | Facility: CLINIC | Age: 74
End: 2024-04-01
Payer: MEDICARE

## 2024-04-01 NOTE — TELEPHONE ENCOUNTER
----- Message from Huyen Lilly MD sent at 3/30/2024 11:45 PM EDT -----  Pls help her make a follow up apt to go over MRI, next available, not urgent. thanks  ----- Message -----  From: Interface, Radiology Results In  Sent: 3/29/2024   3:54 PM EDT  To: Huyen Lilly MD

## 2024-04-09 ENCOUNTER — TELEPHONE (OUTPATIENT)
Dept: PHYSICAL MEDICINE AND REHAB | Facility: CLINIC | Age: 74
End: 2024-04-09

## 2024-04-11 ENCOUNTER — NURSING HOME VISIT (OUTPATIENT)
Dept: POST ACUTE CARE | Facility: EXTERNAL LOCATION | Age: 74
End: 2024-04-11
Payer: MEDICARE

## 2024-04-11 DIAGNOSIS — R26.2 DIFFICULTY WALKING: ICD-10-CM

## 2024-04-11 DIAGNOSIS — I82.4Z1 DEEP VEIN THROMBOSIS (DVT) OF DISTAL VEIN OF RIGHT LOWER EXTREMITY, UNSPECIFIED CHRONICITY (MULTI): Primary | ICD-10-CM

## 2024-04-11 DIAGNOSIS — K92.1 HEMATOCHEZIA: ICD-10-CM

## 2024-04-11 DIAGNOSIS — M16.0 OSTEOARTHRITIS OF BOTH HIPS, UNSPECIFIED OSTEOARTHRITIS TYPE: ICD-10-CM

## 2024-04-11 PROCEDURE — 99308 SBSQ NF CARE LOW MDM 20: CPT | Performed by: NURSE PRACTITIONER

## 2024-04-11 NOTE — LETTER
Patient: Noemi Pérez  : 1950    Encounter Date: 2024    Patient ID: Noemi Pérez is a 73 y.o. female who is long term resident being seen and evaluated for multiple medical problems.  [id number]   1950    /68   Pulse 72   Temp 36.7 °C (98 °F)   Resp 16   Wt 79.8 kg (176 lb)   SpO2 97%   BMI 32.19 kg/m²     Assessment/Plan  Problem List Items Addressed This Visit       Difficulty walking     DR Lilly apt 5/15/2024          Osteoarthritis of both hips     Tramadol to 50 mg by mouth BID as needed   Acetaminophen 650 mg by mouth every 8 hrs as needed  Dr Rueda (Ortho)            Deep vein thrombosis (DVT) of right lower extremity (Multi) - Primary     PMH  Apixaban 5 mg by mouth BID   11/15/2023 Dr Leigh vascular apt completed   Follow up apt 5/15/2024         Hematochezia     3/4/2024 H/H 11.9/36.6  H/O hemorrhoidectomy  H/O anal fistula x 2  (Seton intact x 2)   Apt with Dr Villalobos (Surgeon) for evaluation of site 2024  No further reported episodes               HPI: Client pending follow up apt with Dr Villalobos, Dr Lilly, and Dr Leigh. She continues to decline follow up apt with Gynecology despite education. Client denies HA, dizziness, or lightheadedness. Denies CP, SOB, or increased edema. RA. Denies abdominal pain, N/V, diarrhea, or constipation. Denies dysuria. Denies change in appetite. She C/O chronic bilateral hip pain.     Review of Systems ROS as described in HPI     Physical Exam  Constitutional:       Comments: Sitting in bed    HENT:      Mouth/Throat:      Mouth: Mucous membranes are moist.   Cardiovascular:      Rate and Rhythm: Normal rate.   Pulmonary:      Effort: Pulmonary effort is normal.      Breath sounds: Normal breath sounds.      Comments: RA  Abdominal:      General: Bowel sounds are normal.   Genitourinary:     Comments: Dysuria   Musculoskeletal:      Comments: WC  Slight BLE edema    Skin:     General: Skin is warm and dry.   Neurological:      Mental  Status: She is alert and oriented to person, place, and time.   Psychiatric:         Mood and Affect: Mood normal.                   Electronically Signed By: HALEIGH Mcgovern   4/14/24  9:16 AM

## 2024-04-14 VITALS
TEMPERATURE: 98 F | DIASTOLIC BLOOD PRESSURE: 68 MMHG | RESPIRATION RATE: 16 BRPM | OXYGEN SATURATION: 97 % | HEART RATE: 72 BPM | BODY MASS INDEX: 32.19 KG/M2 | SYSTOLIC BLOOD PRESSURE: 132 MMHG | WEIGHT: 176 LBS

## 2024-04-14 NOTE — ASSESSMENT & PLAN NOTE
PM  Apixaban 5 mg by mouth BID   11/15/2023 Dr Leigh vascular apt completed   Follow up apt 5/15/2024

## 2024-04-14 NOTE — ASSESSMENT & PLAN NOTE
3/4/2024 H/H 11.9/36.6  H/O hemorrhoidectomy  H/O anal fistula x 2  (Seton intact x 2)   Apt with Dr Villalobos (Surgeon) for evaluation of site 4/19/2024  No further reported episodes

## 2024-04-14 NOTE — PROGRESS NOTES
Patient ID: Noemi Pérez is a 73 y.o. female who is long term resident being seen and evaluated for multiple medical problems.  [id number]   1950    /68   Pulse 72   Temp 36.7 °C (98 °F)   Resp 16   Wt 79.8 kg (176 lb)   SpO2 97%   BMI 32.19 kg/m²     Assessment/Plan  Problem List Items Addressed This Visit       Difficulty walking     DR Lilly apt 5/15/2024          Osteoarthritis of both hips     Tramadol to 50 mg by mouth BID as needed   Acetaminophen 650 mg by mouth every 8 hrs as needed  Dr Rueda (Ortho)            Deep vein thrombosis (DVT) of right lower extremity (Multi) - Primary     PMH  Apixaban 5 mg by mouth BID   11/15/2023 Dr Leigh vascular apt completed   Follow up apt 5/15/2024         Hematochezia     3/4/2024 H/H 11.9/36.6  H/O hemorrhoidectomy  H/O anal fistula x 2  (Seton intact x 2)   Apt with Dr Villalobos (Surgeon) for evaluation of site 4/19/2024  No further reported episodes               HPI: Client pending follow up apt with Dr Villalobos, Dr Lilly, and Dr Leigh. She continues to decline follow up apt with Gynecology despite education. Client denies HA, dizziness, or lightheadedness. Denies CP, SOB, or increased edema. RA. Denies abdominal pain, N/V, diarrhea, or constipation. Denies dysuria. Denies change in appetite. She C/O chronic bilateral hip pain.     Review of Systems ROS as described in HPI     Physical Exam  Constitutional:       Comments: Sitting in bed    HENT:      Mouth/Throat:      Mouth: Mucous membranes are moist.   Cardiovascular:      Rate and Rhythm: Normal rate.   Pulmonary:      Effort: Pulmonary effort is normal.      Breath sounds: Normal breath sounds.      Comments: RA  Abdominal:      General: Bowel sounds are normal.   Genitourinary:     Comments: Dysuria   Musculoskeletal:      Comments: WC  Slight BLE edema    Skin:     General: Skin is warm and dry.   Neurological:      Mental Status: She is alert and oriented to person, place, and time.    Psychiatric:         Mood and Affect: Mood normal.

## 2024-04-17 NOTE — PROGRESS NOTES
"Chief complaint: Difficulty walking follow-up     This is a pleasant 73-year-old right-handed woman with past medical history significant for DVT on Eliquis, HLD, HTN, GERD, nephrolithiasis, CKD, depression, bipolar disorder, who presents for follow-up of difficulty walking and deconditioning.    She was last seen here on 2/20/2024, at which point I recommended to take gabapentin 600 mg twice daily given the kidney disease.  She is still taking it 300 mg 3 times per day.    I ordered lateral knee x-rays, reports from outside hospital were received and reviewed today.    X-ray reports received, outside hospital, no images available:  Bilateral knees 2/29/2024: Mild arthritic degenerative changes bilaterally.    Lumbar x-ray 2/29/2024: Spondylitic changes of the lumbar spine with degenerative disc disease worse at L5-S1.    Ordered a lumbar MRI and this was done on 3/29/2024, personally reviewed and interpreted today, discussed with the patient.  There was no metastatic disease, there was asymmetric atrophy of the right kidney, and there was multilevel spondylosis.  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  She kept saying \"I did not want to go\".  When asked her why, she said it is difficult for her to get around.  I reminded her that she got to this appointment today, so she can go to the gynecology appointment and I reiterated the importance as well.    She is not doing any exercises, either sitting in a chair or lying in bed all day.  I stressed the importance of this again    She wants knee joint injections today.    She rates her pain as 6/10.    Otherwise, there have been no changes to her medications or past medical history since last " "visit    _______________________  4/18/2024: Bilateral knee joint injections, lumbar MRI reviewed, consider other medications and injections in the future, PT ordered, do home exercises, see gynecology    __________________________  As a reminder:     TIMELINE OF COMPLAINT(S):    Patient has been having significant pain in bilateral hips and knees for several years, progressively with more debility over time, going from pain to walker to wheelchair bound.  She \"was supposed to have surgery last July but then they found a lot of blood clots and I was advised not to do surgery until the blood clots were treated\".    She had previous CT and MRIs of the pelvis which saw some thickened endometrium and fluid, referral to gynecology was recommended, but the patient does not think that this was done.  Upon chart review, I do not see any gynecological evaluations or pelvic ultrasounds.    She feels that her weakness is both due to pain and deconditioning.  She denies any issues in the upper extremities.  She does not have any significant low back pain.    Note from Dr. Leigh 11/15/2023 personally reviewed today.    Note from Dr. Rueda 5/23/2023 personally reviewed today.  He recommended bilateral hip replacements pending medical clearance     Pain:  LOCATION- Bilateral leg anterior thighs, lateral hips, knees , no back pain  RADIATION-  ASSOCIATED WITH- Swelling  NUMBNESS/TINGLING- Yes, calves  WEAKNESS- Yes  CONSTANT or INTERMITTENT- Intermittent, sometimes wakes up at night  SEVERITY/QUANTITY- 5  QUALITY- Burning, dull, achy, throbbing  EXACERBATED BY-  nothing  BETTER WITH- Medication  TRIED- Tramadol helps sometimes       Anti-Inflammatories: (Patient is on Eliquis and has history of CKD), previously diclofenac, meloxicam, Medrol Dosepak      Muscle relaxants: Baclofen 5 mg twice daily \"I think it helps\"      Anti-depressants:      Neuroleptics: Gabapentin 400 mg 3 times daily \"I think it helps\"      LDN:     PHYSICAL " THERAPY: Yes, last time several months ago.  No HEP  TENS unit: No  CHIROPRACTIC MANIPULATION: Yes  ACUPUNCTURE TREATMENTS: No  DEEP TISSUE MASSAGE THERAPY: No  OSTEOPATHIC MANIPULATION THERAPY: No  INJECTIONS: L hip joint injection 2-3 years ago, helped  EMG/NCS: No     IMAGING: Yes     === 06/24/22 ===     CT ABDOMEN PELVIS W IV CONTRAST     IMPRESSION:  1. Findings of acute pancreatitis. Homogeneous pancreas without  pseudocyst or necrosis.  2. Distended gallbladder with likely gallstones.  3. Mild intrahepatic biliary dilatation.  4. Areas of cortical thinning and scarring involving the right kidney  with extrarenal right renal pelvis versus nonspecific right renal  pelvis dilatation.  5. Fluid within endometrial canal. Endometrial pathology not  excluded. Recommend a nonemergent follow-up pelvic ultrasound to  better evaluate.       === 06/04/21 ===     - Impression -  No evidence of acute intrathoracic abnormality.     Hip x-rays 4/27/2023: Severe bilateral hip OA with possible bilateral AVN.     Knee and lumbar x-rays in March of 2023 ordered but never done      X-ray reports received, outside hospital, no images available:  Bilateral knees 2/29/2024: Mild arthritic degenerative changes bilaterally.    Lumbar x-ray 2/29/2024: Spondylitic changes of the lumbar spine with degenerative disc disease worse at L5-S1.    === 03/29/24 ===    MR LUMBAR SPINE WO CONTRAST    - Impression -  There is no focal bone marrow signal abnormality to suggest osseous  metastatic disease.    There is multilevel spondylosis as described above.    There is asymmetric atrophy of the right kidney. There are small  subcentimeter suspected cystic foci within the kidneys bilaterally.           Lab Results   Component Value Date     HGBA1C 5.3 08/15/2021         FUNCTIONAL HISTORY: The patient is independent in all ADLs, mobility, and driving. The patient uses WC now, last time she used a walker was a 1-2 years ago.     SH:  Lives in:  [facility]  Lives with: Nursing home or assisted living for the past few years  Occupation: Retired  Tobacco: Former, quit 30 years ago  Alcohol: No  Drugs: No     ________________________  ROS: The patient denies any bowel or bladder incontinence/accidents, night sweats, fevers, chills, recent significant weight loss. A 14 point review of systems was reviewed with the patient and is as above and otherwise negative.  ROS questionnaire positive for weakness, poor balance, difficulty walking, muscle pain/tenderness, joint pain, ankle swelling, depression, anxiety, sleep disturbances, chronic urinary incontinence, difficulty with ADLs, difficulty eating and swallowing        PHYSICAL EXAM     GEN - Alert, well-developed, well-nourished, no acute distress, sitting in wheelchair  PSYCH - Cooperative, appropriate mood and affect  HEENT - NC/AT  RESP - Non-labored respirations, equal expansion  CV - warm and well-perfused, No cyanosis, mild edema in extremities.  ABD- soft, ND  SKIN - No rash.    Previous:     MSK: Unable to fully test range of motion in bilateral hips and knees, but any movement, even by a few degrees in any direction of both hips and knees reproduce significant pain and she did not want me to keep going further.      NEURO - UE strength 5/5 -  including shoulder abduction, biceps, triceps, wrist extensors, finger flexors, interossei, and    LE strength -  including hip flexors (1/5 bilaterally), knee flexors (3/5 bilaterally), knee extensors (4 -/5 bilaterally), 4/5 bilaterally ankle dorsiflexors, ankle plantar flexors, and EHL   Sensation - intact to light touch in bilateral lower and upper extremities.   Reflexes -diminished but equal biceps, brachioradialis, triceps, patellar and absent Achilles reflexes bilaterally No Clonus, No Babinski, Nolasco's negative bilaterally  GAIT -deferred    PROCEDURE:    Lidocaine 1%- 3 cc's used, 0 cc's wasted x 2  200mg/20mL (10mg/mL)  NDC 4415-9170-71  LOT  [id number]  EXP 02/01/2025  Manuf: [employer]    Kenalog 40- 1 cc's used, 0 cc's wasted X 2  NDC 1997-8923-65  LOT [id number]  EXP 11/2025  Manuf: [employer]    Bilateral anterior lateral knee corticosteroid injection:    Description of the Procedure: The procedure, risks and alternative treatments were discussed with the patient. After written informed consent was obtained, the injection site was identified and the skin was prepped three times with alcohol and then betadine swabs. In a sterile fashion, using a 27 gauge 1.5 inch needle, after negative aspiration, the site was injected with a total of 4 cc mixture of 3 cc 1% lidocaine and 1 cc 40 mg Kenalog. The patient tolerated the procedure well with no immediate complications or bleeding. She was able to perform range of motion exercises immediately postprocedure, and had no difficulties with ambulation.    Plan:   1. The patient was instructed in post-procedural care.  2. The patient was asked to apply moist heat and or ice for the next 24 hours and to perform daily gentle stretching exercises.     Physical Exam  Musculoskeletal:        Legs:            IMPRESSION:     This is a pleasant 73-year-old right-handed woman with past medical history significant for DVT on Eliquis, HLD, HTN, GERD, nephrolithiasis, CKD, depression, bipolar disorder, who presents for follow up of difficulty walking and deconditioning.  Physical exam is notable for diffuse weakness and atrophy in bilateral lower extremities.  Unclear if this is simply disuse atrophy from significant bilateral hip pain and sitting in a wheelchair for over a year or if there is a component of lumbar stenosis.  Urinary incontinence may be coming from that versus just a weakened pelvic floor.  However, the patient has had unexplained unprovoked DVTs, and previous imaging concerning for endometrial fluid, it was recommended that she follow-up with gynecology and pelvic ultrasound, which seems like this has  never happened.  I referred her toGynecology previously, and I reiterated the importance of this again today.      -Bilateral knee joint injections performed as above.  There were no complications and she tolerated the procedures well.  She was provided with postprocedure instructions.  -Continue medications for now, but consider going up on baclofen.   -Given kidney disease, it is better to take gabapentin 600 mg twice a day  -Consider other medications in the future including cymbalta  -Consider injections: ultrasound-guided hip joint injections, referral for spine injections  -Referral provided to gynecology last time, this is important.  -PT referral provided. This is important  -Do daily exercises in your room, moving your legs and hips around is much as possible  -Follow-up 6-8 weeks           The patient expressed understanding and agreement with the assessment and plan. Patient encouraged to contact us should they have any questions, concerns, or any changes in symptoms.      Thank you for allowing me to participate in the care of your patient.        ** Dictated with voice recognition software, please forgive any errors in grammar and/or spelling **

## 2024-04-18 ENCOUNTER — OFFICE VISIT (OUTPATIENT)
Dept: PHYSICAL MEDICINE AND REHAB | Facility: CLINIC | Age: 74
End: 2024-04-18
Payer: MEDICARE

## 2024-04-18 VITALS
OXYGEN SATURATION: 96 % | BODY MASS INDEX: 32.19 KG/M2 | HEIGHT: 62 IN | SYSTOLIC BLOOD PRESSURE: 109 MMHG | TEMPERATURE: 96.8 F | HEART RATE: 73 BPM | DIASTOLIC BLOOD PRESSURE: 67 MMHG

## 2024-04-18 DIAGNOSIS — M25.562 CHRONIC PAIN OF BOTH KNEES: ICD-10-CM

## 2024-04-18 DIAGNOSIS — G89.29 CHRONIC PAIN OF BOTH KNEES: ICD-10-CM

## 2024-04-18 DIAGNOSIS — R29.898 WEAKNESS OF BOTH LOWER EXTREMITIES: Primary | ICD-10-CM

## 2024-04-18 DIAGNOSIS — M17.10 ARTHRITIS OF KNEE: ICD-10-CM

## 2024-04-18 DIAGNOSIS — R32 URINARY INCONTINENCE, UNSPECIFIED TYPE: ICD-10-CM

## 2024-04-18 DIAGNOSIS — M16.0 OSTEOARTHRITIS OF BOTH HIPS, UNSPECIFIED OSTEOARTHRITIS TYPE: ICD-10-CM

## 2024-04-18 DIAGNOSIS — R53.81 PHYSICAL DECONDITIONING: ICD-10-CM

## 2024-04-18 DIAGNOSIS — M25.551 BILATERAL HIP PAIN: ICD-10-CM

## 2024-04-18 DIAGNOSIS — G62.9 NEUROPATHY: ICD-10-CM

## 2024-04-18 DIAGNOSIS — M62.838 MUSCLE SPASM: ICD-10-CM

## 2024-04-18 DIAGNOSIS — M16.0 BILATERAL HIP JOINT ARTHRITIS: ICD-10-CM

## 2024-04-18 DIAGNOSIS — M25.552 BILATERAL HIP PAIN: ICD-10-CM

## 2024-04-18 DIAGNOSIS — R26.2 DIFFICULTY WALKING: ICD-10-CM

## 2024-04-18 DIAGNOSIS — M25.561 CHRONIC PAIN OF BOTH KNEES: ICD-10-CM

## 2024-04-18 DIAGNOSIS — R93.89 THICKENED ENDOMETRIUM: ICD-10-CM

## 2024-04-18 PROCEDURE — 3078F DIAST BP <80 MM HG: CPT | Performed by: PHYSICAL MEDICINE & REHABILITATION

## 2024-04-18 PROCEDURE — 3074F SYST BP LT 130 MM HG: CPT | Performed by: PHYSICAL MEDICINE & REHABILITATION

## 2024-04-18 PROCEDURE — 1125F AMNT PAIN NOTED PAIN PRSNT: CPT | Performed by: PHYSICAL MEDICINE & REHABILITATION

## 2024-04-18 PROCEDURE — 1159F MED LIST DOCD IN RCRD: CPT | Performed by: PHYSICAL MEDICINE & REHABILITATION

## 2024-04-18 PROCEDURE — 99214 OFFICE O/P EST MOD 30 MIN: CPT | Performed by: PHYSICAL MEDICINE & REHABILITATION

## 2024-04-18 PROCEDURE — 20610 DRAIN/INJ JOINT/BURSA W/O US: CPT | Performed by: PHYSICAL MEDICINE & REHABILITATION

## 2024-04-18 PROCEDURE — 1160F RVW MEDS BY RX/DR IN RCRD: CPT | Performed by: PHYSICAL MEDICINE & REHABILITATION

## 2024-04-18 RX ORDER — CALCIUM CARBONATE 200(500)MG
1 TABLET,CHEWABLE ORAL DAILY
COMMUNITY

## 2024-04-18 RX ORDER — TRIAMCINOLONE ACETONIDE 40 MG/ML
80 INJECTION, SUSPENSION INTRA-ARTICULAR; INTRAMUSCULAR ONCE
Status: COMPLETED | OUTPATIENT
Start: 2024-04-18 | End: 2024-04-18

## 2024-04-18 RX ORDER — MAGNESIUM HYDROXIDE 1200 MG/15ML
LIQUID ORAL
COMMUNITY

## 2024-04-18 RX ORDER — DOCUSATE SODIUM 100 MG/1
100 CAPSULE, LIQUID FILLED ORAL 2 TIMES DAILY
COMMUNITY

## 2024-04-18 RX ORDER — ACETAMINOPHEN 325 MG/1
325 TABLET ORAL EVERY 8 HOURS PRN
COMMUNITY

## 2024-04-18 RX ORDER — BISACODYL 10 MG/1
SUPPOSITORY RECTAL
COMMUNITY

## 2024-04-18 RX ADMIN — TRIAMCINOLONE ACETONIDE 80 MG: 40 INJECTION, SUSPENSION INTRA-ARTICULAR; INTRAMUSCULAR at 14:16

## 2024-04-18 ASSESSMENT — PAIN SCALES - GENERAL: PAINLEVEL: 6

## 2024-05-15 ENCOUNTER — OFFICE VISIT (OUTPATIENT)
Dept: CARDIOLOGY | Facility: HOSPITAL | Age: 74
End: 2024-05-15
Payer: MEDICARE

## 2024-05-15 VITALS — DIASTOLIC BLOOD PRESSURE: 74 MMHG | SYSTOLIC BLOOD PRESSURE: 114 MMHG | HEART RATE: 73 BPM | OXYGEN SATURATION: 95 %

## 2024-05-15 DIAGNOSIS — I82.4Z1 ACUTE DEEP VEIN THROMBOSIS (DVT) OF DISTAL VEIN OF RIGHT LOWER EXTREMITY (MULTI): Primary | ICD-10-CM

## 2024-05-15 PROCEDURE — 1160F RVW MEDS BY RX/DR IN RCRD: CPT | Performed by: INTERNAL MEDICINE

## 2024-05-15 PROCEDURE — 1036F TOBACCO NON-USER: CPT | Performed by: INTERNAL MEDICINE

## 2024-05-15 PROCEDURE — 1159F MED LIST DOCD IN RCRD: CPT | Performed by: INTERNAL MEDICINE

## 2024-05-15 PROCEDURE — 3078F DIAST BP <80 MM HG: CPT | Performed by: INTERNAL MEDICINE

## 2024-05-15 PROCEDURE — 3074F SYST BP LT 130 MM HG: CPT | Performed by: INTERNAL MEDICINE

## 2024-05-15 PROCEDURE — 99214 OFFICE O/P EST MOD 30 MIN: CPT | Performed by: INTERNAL MEDICINE

## 2024-05-15 NOTE — PATIENT INSTRUCTIONS
I would recommend continuing the Eliquis 5 mg twice a day indefinitely.    I would also recommend use of compression socks to help with any leg swelling and discomfort.    You should follow up here in 6 months.    Should you have questions, please do not hesitate to call my office at [phone], or you can reach me on [employer] if you have signed up for it. If you have urgent concerns, call the office, do not use [employer].

## 2024-05-15 NOTE — PROGRESS NOTES
DVT      History of Present Illness:  Noemi Pérez is a/an 73 y.o. who follows up for unprovoked DVT.    She continues on apixaban 5 mg twice daily.    She was last seen on 11/15/2023.    I referred her to Dr. Lilly in PM&R because of significant lower extremity weakness of uncertain/unexplored etiology. Dr. Lilly was concerned about previous imaging suggesting endometrial fluid with need for further evaluation with gynecology. Referral was made but patient still has not seen.    She denies bleeding including epistaxis, gingival bleeding, hemoptysis, hematemesis, hematochezia, melena, hematuria, and vaginal bleeding.    Still in bed all day. Not doing any exercises. Not wearing compression socks.    Reports right leg pain that has been present for years and is not significantly changed recently.    From previous visit  Noemi Pérez is a 73 y/o woman who lives in long-term care facility due to physical debility for reasons that she doesn't understand. Referred by Dr. Bailey because of apparently unprovoked DVT in July 2023.      Can't transfer out of wheelchair by herself  Last able to really take care of herself more than a year ago     July really no provokers other than the fact that she's unable to walk  Both hips are bad, needs left hip replacement  Hasn't seen her primary doctor in ages because she says she hasn't had a reason to     Lots of surgeries--3 kidney surgeries, gallbladder, hemorrhoid, eardrum  Needs bilateral hip replacements  Saw someone in [place] for her DVT (Dr. Toney, vas surgery)     No prior personal history of VTE  No family history of VTE     Sitting a lot with legs dependent; elevates in bed; sometimes elevates during the day; has been wearing compression socks      Past Medical History:   Diagnosis Date    Personal history of other diseases of the circulatory system     History of hypertension     Past Surgical History:   Procedure Laterality Date    GALLBLADDER SURGERY       IR NEPHROSTOMY TUBE EXCHANGE  02/10/2023    IR NEPHROSTOMY TUBE EXCHANGE 2/10/2023 [facility]     Social History     Tobacco Use    Smoking status: Former     Types: Cigarettes     Passive exposure: Past    Smokeless tobacco: Never   Substance Use Topics    Alcohol use: Never    Drug use: Never     Family History   Problem Relation Name Age of Onset    Hypertension Mother      Lung cancer Father       Current Outpatient Medications   Medication Sig Dispense Refill    acetaminophen (Tylenol) 325 mg tablet Take 1 tablet (325 mg) by mouth every 8 hours if needed for mild pain (1 - 3). 2 tab      ARIPiprazole (Abilify) 10 mg tablet Take 1 tablet (10 mg) by mouth once daily. 90 tablet 3    baclofen (Lioresal) 5 mg tablet 1 tablet (5 mg) 2 times a day.      bisacodyl (Dulcolax, bisacodyl,) 10 mg suppository Insert into the rectum.      calcium carbonate (Tums) 200 mg calcium chewable tablet Chew 1 tablet (500 mg) once daily.      COVID [id number],12up,,andu,,PF, (Spikevax [id number],12y up,,PF,) 50 mcg/0.5 mL suspension Inject 0.5 mL (50 mcg) into the muscle 1 time.      cyanocobalamin (Vitamin B-12) 1,000 mcg/mL injection Inject 1 mL (1,000 mcg) into the muscle every 30 (thirty) days.      docusate sodium (Colace) 100 mg capsule Take 1 capsule (100 mg) by mouth 2 times a day.      donepezil (Aricept) 5 mg tablet Take 1 tablet (5 mg) by mouth once daily at bedtime.      Eliquis 5 mg tablet 1 tablet (5 mg) 2 times a day.      escitalopram (Lexapro) 10 mg tablet TAKE ONE TABLET BY MOUTH EVERY DAY 30 tablet 2    ferrous sulfate 325 (65 Fe) MG EC tablet Take 65 mg by mouth 3 times a day with meals. Do not crush, chew, or split.      furosemide (Lasix) 40 mg tablet Take 1 tablet (40 mg) by mouth once daily. 30 tablet 11    gabapentin (Neurontin) 400 mg capsule Take 1 capsule (400 mg) by mouth 3 times a day. 90 capsule 11    ondansetron (Zofran) 4 mg tablet 1 tablet (4 mg) every 6 hours if needed.      oxybutynin XL  (Ditropan-XL) 15 mg 24 hr tablet TAKE ONE TABLET BY MOUTH EVERY DAY DO NOT CRUSH,CHEW OR SPLIT 30 tablet 1    pravastatin (Pravachol) 10 mg tablet       sodium phosphates (Enema) 19-7 gram/118 mL enema enema Insert into the rectum.      traMADol (Ultram) 50 mg tablet Take by mouth.       No current facility-administered medications for this visit.       Physical Examination:  Blood pressure 114/74, pulse 73, SpO2 95%.  No distress  No JVD or carotid bruits  Lungs clear bilaterally  Heart regular and without murmurs  Abdomen soft and non-tender  Mild symmetric leg swelling  Pulses intact      Pertinent Labs:    Pertinent Imaging:    Venous duplex ultrasound 12/9/2022   IMPRESSION:  Negative study.  No deep venous thrombosis of the  bilateral lower  extremity.    Diagnoses and all orders for this visit:  Acute deep vein thrombosis (DVT) of distal vein of right lower extremity (Multi) (Primary)  Continue indefinite anticoagulation   Encouraged physical therapy/exercise  Encouraged scheduling with gynecology    Follow up in 6 months.    Yulia Leigh MD, MS

## 2024-05-30 ENCOUNTER — NURSING HOME VISIT (OUTPATIENT)
Dept: POST ACUTE CARE | Facility: EXTERNAL LOCATION | Age: 74
End: 2024-05-30
Payer: MEDICARE

## 2024-05-30 DIAGNOSIS — I82.4Z1 DEEP VEIN THROMBOSIS (DVT) OF DISTAL VEIN OF RIGHT LOWER EXTREMITY, UNSPECIFIED CHRONICITY (MULTI): Primary | ICD-10-CM

## 2024-05-30 DIAGNOSIS — D64.9 ANEMIA, UNSPECIFIED TYPE: ICD-10-CM

## 2024-05-30 DIAGNOSIS — M16.0 OSTEOARTHRITIS OF BOTH HIPS, UNSPECIFIED OSTEOARTHRITIS TYPE: ICD-10-CM

## 2024-05-30 DIAGNOSIS — R53.81 PHYSICAL DECONDITIONING: ICD-10-CM

## 2024-05-30 PROCEDURE — 99308 SBSQ NF CARE LOW MDM 20: CPT | Performed by: NURSE PRACTITIONER

## 2024-05-30 NOTE — LETTER
Patient: Noemi Pérez  : 1950    Encounter Date: 2024    Patient ID: Noemi Pérez is a 73 y.o. female who is long term resident being seen and evaluated for multiple medical problems.  [id number]   1950    /76   Pulse 77   Temp 36.7 °C (98 °F)   Resp 16   Wt 79.8 kg (176 lb)   SpO2 97%   BMI 32.19 kg/m²     Assessment/Plan  Problem List Items Addressed This Visit       Osteoarthritis of both hips     Chronic bilateral Hip and knee pain   Tramadol  50 mg by mouth BID as needed   Acetaminophen 650 mg by mouth every 8 hrs as needed  Gabapentin 600 mg by mouth BID   Dr Lilly apt completed 2024- Bilateral knee injections and PT               Physical deconditioning     PT         Anemia     Ferrous sulfate 325 mg by mouth daily   B12 1000 mcg IM monthly   2023 H/H 11.3/35  3/4/2024 H/H 11.9/36.6         Deep vein thrombosis (DVT) of right lower extremity (Multi) - Primary     PMH  Apixaban 5 mg by mouth BID   Dr Leigh vascular apt completed 5/15/2024  Continue Apixaban              HPI: Client receiving PT services. Client denies HA, dizziness, or lightheadedness. Denies CP, SOB,  or increased edema. RA. Denies abdominal pain, N/V, diarrhea, or constipation. Denies dysuria. Denies change in appetite. Denies insomnia. C/O chronic knee and hip pain.     Review of Systems ROS as described in HPI     Physical Exam  Constitutional:       Comments: Sitting in WC working with PT   HENT:      Mouth/Throat:      Mouth: Mucous membranes are moist.   Cardiovascular:      Rate and Rhythm: Normal rate.   Pulmonary:      Effort: Pulmonary effort is normal.      Comments: RA  Abdominal:      General: Bowel sounds are normal.   Genitourinary:     Comments: Denies dysuria   Musculoskeletal:      Comments: Slight BLE edema   WC transfers   PT   Skin:     General: Skin is warm and dry.   Neurological:      Mental Status: She is alert. Mental status is at baseline.   Psychiatric:         Mood  and Affect: Mood normal.                   Electronically Signed By: HALEIGH Mcgovern   6/2/24  9:53 AM

## 2024-06-02 VITALS
SYSTOLIC BLOOD PRESSURE: 128 MMHG | BODY MASS INDEX: 32.19 KG/M2 | DIASTOLIC BLOOD PRESSURE: 76 MMHG | RESPIRATION RATE: 16 BRPM | OXYGEN SATURATION: 97 % | HEART RATE: 77 BPM | TEMPERATURE: 98 F | WEIGHT: 176 LBS

## 2024-06-02 NOTE — ASSESSMENT & PLAN NOTE
Chronic bilateral Hip and knee pain   Tramadol  50 mg by mouth BID as needed   Acetaminophen 650 mg by mouth every 8 hrs as needed  Gabapentin 600 mg by mouth BID   Dr Lilly apt completed 4/18/2024- Bilateral knee injections and PT

## 2024-06-02 NOTE — PROGRESS NOTES
Patient ID: Noemi Pérez is a 73 y.o. female who is long term resident being seen and evaluated for multiple medical problems.  [id number]   1950    /76   Pulse 77   Temp 36.7 °C (98 °F)   Resp 16   Wt 79.8 kg (176 lb)   SpO2 97%   BMI 32.19 kg/m²     Assessment/Plan  Problem List Items Addressed This Visit       Osteoarthritis of both hips     Chronic bilateral Hip and knee pain   Tramadol  50 mg by mouth BID as needed   Acetaminophen 650 mg by mouth every 8 hrs as needed  Gabapentin 600 mg by mouth BID   Dr Lilly apt completed 4/18/2024- Bilateral knee injections and PT               Physical deconditioning     PT         Anemia     Ferrous sulfate 325 mg by mouth daily   B12 1000 mcg IM monthly   11/9/2023 H/H 11.3/35  3/4/2024 H/H 11.9/36.6         Deep vein thrombosis (DVT) of right lower extremity (Multi) - Primary     PMH  Apixaban 5 mg by mouth BID   Dr Leigh vascular apt completed 5/15/2024  Continue Apixaban              HPI: Client receiving PT services. Client denies HA, dizziness, or lightheadedness. Denies CP, SOB,  or increased edema. RA. Denies abdominal pain, N/V, diarrhea, or constipation. Denies dysuria. Denies change in appetite. Denies insomnia. C/O chronic knee and hip pain.     Review of Systems ROS as described in HPI     Physical Exam  Constitutional:       Comments: Sitting in WC working with PT   HENT:      Mouth/Throat:      Mouth: Mucous membranes are moist.   Cardiovascular:      Rate and Rhythm: Normal rate.   Pulmonary:      Effort: Pulmonary effort is normal.      Comments: RA  Abdominal:      General: Bowel sounds are normal.   Genitourinary:     Comments: Denies dysuria   Musculoskeletal:      Comments: Slight BLE edema   WC transfers   PT   Skin:     General: Skin is warm and dry.   Neurological:      Mental Status: She is alert. Mental status is at baseline.   Psychiatric:         Mood and Affect: Mood normal.

## 2024-06-02 NOTE — ASSESSMENT & PLAN NOTE
[facility]  Apixaban 5 mg by mouth BID   Dr Leigh vascular apt completed 5/15/2024  Continue Apixaban

## 2024-06-13 ENCOUNTER — NURSING HOME VISIT (OUTPATIENT)
Dept: POST ACUTE CARE | Facility: EXTERNAL LOCATION | Age: 74
End: 2024-06-13
Payer: MEDICARE

## 2024-06-13 DIAGNOSIS — M16.0 OSTEOARTHRITIS OF BOTH HIPS, UNSPECIFIED OSTEOARTHRITIS TYPE: ICD-10-CM

## 2024-06-13 DIAGNOSIS — E78.5 HYPERLIPIDEMIA, UNSPECIFIED HYPERLIPIDEMIA TYPE: Primary | ICD-10-CM

## 2024-06-13 DIAGNOSIS — D64.9 ANEMIA, UNSPECIFIED TYPE: ICD-10-CM

## 2024-06-13 PROCEDURE — 99308 SBSQ NF CARE LOW MDM 20: CPT | Performed by: NURSE PRACTITIONER

## 2024-06-13 NOTE — LETTER
Patient: Noemi Pérez  : 1950    Encounter Date: 2024    Patient ID: Noemi Pérez is a 73 y.o. female who is long term resident being seen and evaluated for multiple medical problems.  [id number]   1950    /74   Pulse 75   Temp 37.1 °C (98.7 °F)   Resp 16   Wt 79.8 kg (176 lb)   SpO2 97%   BMI 32.19 kg/m²     Assessment/Plan  Problem List Items Addressed This Visit       Hyperlipidemia - Primary     2024 C 262, T 314, HDL 37,   Increase Pravastatin to 40 mg by mouth every HS          Osteoarthritis of both hips     Chronic bilateral Hip and knee pain   Tramadol  50 mg by mouth BID as needed (Does not want prescribed as scheduled)   Acetaminophen 650 mg by mouth every 8 hrs as needed  Gabapentin 600 mg by mouth BID            Anemia     Ferrous sulfate 325 mg by mouth daily   B12 1000 mcg IM monthly   3/4/2024 H/H 11.9/36.6  2024 H.H 13.5/40.3              HPI: Client C/O chronic bilateral hip and knee pain. Discussed scheduling Tramadol routinely BID- declined. No change in pain medication regimen. Client denies HA, dizziness, or lightheadedness. Denies CP, SOB,  or increased edema. RA. Denies abdominal pain, N/V, diarrhea, or constipation. Denies dysuria. Denies change in appetite.    Review of Systems ROS as described in HPI     Physical Exam  Constitutional:       Comments: Sitting in bed    HENT:      Mouth/Throat:      Mouth: Mucous membranes are moist.   Cardiovascular:      Rate and Rhythm: Normal rate.   Pulmonary:      Effort: Pulmonary effort is normal.      Comments: RA  Abdominal:      General: Bowel sounds are normal.   Genitourinary:     Comments: Denies dysuria   Musculoskeletal:      Comments: PT     Skin:     General: Skin is warm and dry.   Neurological:      Mental Status: She is alert. Mental status is at baseline.   Psychiatric:         Mood and Affect: Mood normal.      Comments: Conversational                    Electronically Signed By: Daiana MONTERROSO  CYNDEE Mann-CNP   6/15/24  3:50 PM

## 2024-06-15 VITALS
WEIGHT: 176 LBS | DIASTOLIC BLOOD PRESSURE: 74 MMHG | HEART RATE: 75 BPM | BODY MASS INDEX: 32.19 KG/M2 | TEMPERATURE: 98.7 F | OXYGEN SATURATION: 97 % | RESPIRATION RATE: 16 BRPM | SYSTOLIC BLOOD PRESSURE: 127 MMHG

## 2024-06-15 NOTE — PROGRESS NOTES
Patient ID: Noemi Pérez is a 73 y.o. female who is long term resident being seen and evaluated for multiple medical problems.  [id number]   1950    /74   Pulse 75   Temp 37.1 °C (98.7 °F)   Resp 16   Wt 79.8 kg (176 lb)   SpO2 97%   BMI 32.19 kg/m²     Assessment/Plan  Problem List Items Addressed This Visit       Hyperlipidemia - Primary     6/7/2024 C 262, T 314, HDL 37,   Increase Pravastatin to 40 mg by mouth every HS          Osteoarthritis of both hips     Chronic bilateral Hip and knee pain   Tramadol  50 mg by mouth BID as needed (Does not want prescribed as scheduled)   Acetaminophen 650 mg by mouth every 8 hrs as needed  Gabapentin 600 mg by mouth BID            Anemia     Ferrous sulfate 325 mg by mouth daily   B12 1000 mcg IM monthly   3/4/2024 H/H 11.9/36.6  6/7/2024 H.H 13.5/40.3              HPI: Client C/O chronic bilateral hip and knee pain. Discussed scheduling Tramadol routinely BID- declined. No change in pain medication regimen. Client denies HA, dizziness, or lightheadedness. Denies CP, SOB,  or increased edema. RA. Denies abdominal pain, N/V, diarrhea, or constipation. Denies dysuria. Denies change in appetite.    Review of Systems ROS as described in HPI     Physical Exam  Constitutional:       Comments: Sitting in bed    HENT:      Mouth/Throat:      Mouth: Mucous membranes are moist.   Cardiovascular:      Rate and Rhythm: Normal rate.   Pulmonary:      Effort: Pulmonary effort is normal.      Comments: RA  Abdominal:      General: Bowel sounds are normal.   Genitourinary:     Comments: Denies dysuria   Musculoskeletal:      Comments: PT     Skin:     General: Skin is warm and dry.   Neurological:      Mental Status: She is alert. Mental status is at baseline.   Psychiatric:         Mood and Affect: Mood normal.      Comments: Conversational

## 2024-06-15 NOTE — ASSESSMENT & PLAN NOTE
Ferrous sulfate 325 mg by mouth daily   B12 1000 mcg IM monthly   3/4/2024 H/H 11.9/36.6  6/7/2024 H.H 13.5/40.3

## 2024-06-15 NOTE — ASSESSMENT & PLAN NOTE
Chronic bilateral Hip and knee pain   Tramadol  50 mg by mouth BID as needed (Does not want prescribed as scheduled)   Acetaminophen 650 mg by mouth every 8 hrs as needed  Gabapentin 600 mg by mouth BID

## 2024-06-18 ENCOUNTER — APPOINTMENT (OUTPATIENT)
Dept: PHYSICAL MEDICINE AND REHAB | Facility: CLINIC | Age: 74
End: 2024-06-18
Payer: MEDICARE

## 2024-06-18 ENCOUNTER — DOCUMENTATION (OUTPATIENT)
Dept: PHYSICAL MEDICINE AND REHAB | Facility: CLINIC | Age: 74
End: 2024-06-18

## 2024-06-18 NOTE — PROGRESS NOTES
The patient did not show up for her scheduled appointment today. She is free to call back and reschedule at her earliest convenience, at which point she will be reminded of our no show policy.  She this is her second time either not showing up or canceling within 24 hours.  She will be given one more opportunity to schedule with us, and if she does not show up again or cancels within 24 hours again, she will unfortunately not be able to be rescheduled at this practice    Huyen Lilly MD  PM&R

## 2024-08-01 ENCOUNTER — NURSING HOME VISIT (OUTPATIENT)
Dept: POST ACUTE CARE | Facility: EXTERNAL LOCATION | Age: 74
End: 2024-08-01
Payer: MEDICARE

## 2024-08-01 DIAGNOSIS — R53.81 PHYSICAL DECONDITIONING: ICD-10-CM

## 2024-08-01 DIAGNOSIS — R60.0 LEG EDEMA: ICD-10-CM

## 2024-08-01 DIAGNOSIS — K59.00 CONSTIPATION, UNSPECIFIED CONSTIPATION TYPE: ICD-10-CM

## 2024-08-01 DIAGNOSIS — D64.9 ANEMIA, UNSPECIFIED TYPE: ICD-10-CM

## 2024-08-01 DIAGNOSIS — I82.4Z1 DEEP VEIN THROMBOSIS (DVT) OF DISTAL VEIN OF RIGHT LOWER EXTREMITY, UNSPECIFIED CHRONICITY (MULTI): Primary | ICD-10-CM

## 2024-08-01 DIAGNOSIS — N18.9 CHRONIC KIDNEY DISEASE, UNSPECIFIED CKD STAGE: ICD-10-CM

## 2024-08-01 PROCEDURE — 99308 SBSQ NF CARE LOW MDM 20: CPT | Performed by: NURSE PRACTITIONER

## 2024-08-01 NOTE — LETTER
Patient: Noemi Pérez  : 1950    Encounter Date: 2024    Patient ID: Noemi Pérez is a 73 y.o. female who is long term resident being seen and evaluated for multiple medical problems.  [id number]   1950    /68   Pulse 70   Temp 36.8 °C (98.2 °F)   Resp 15   Wt 79.4 kg (175 lb)   SpO2 95%   BMI 32.01 kg/m²     Assessment/Plan  Problem List Items Addressed This Visit       Chronic kidney disease     Monitor renal panel  Avoid NSAIDS  2023 BUN/Creat 16/1.57  BMP X 1         Constipation     Colace 100 mg by mouth BID  MiraLax 17 grams by mouth daily   MOM 30 ml by mouth daily as needed         Physical deconditioning     PT         Anemia     Ferrous sulfate 325 mg by mouth daily   B12 1000 mcg IM monthly   2024 H.H 13.5/40.3         Leg edema     10/31/2023 Dr Bailey (Cardiology) apt completed   Lasix 40 mg by mouth daily   NAT at [facility] 11/15/2023          Deep vein thrombosis (DVT) of right lower extremity (Multi) - Primary     PMH  Apixaban 5 mg by mouth BID   Dr Leigh vascular apt completed 2024  CBC with diff x 1              HPI: Client continues to receive PT services. Client denies HA, dizziness, or lightheadedness. Denies CP, SOB, Cough, or increased edema. RA. Denies abdominal pain, N/V, or diarrhea. C/O constipation. Denies dysuria. Denies change in appetite. Denies insomnia. C/O chronic bilateral knee pain.     Review of Systems ROS as described in HPI     Physical Exam  Constitutional:       Comments: Sitting in WC   HENT:      Head: Normocephalic.      Mouth/Throat:      Mouth: Mucous membranes are moist.   Cardiovascular:      Rate and Rhythm: Normal rate.   Pulmonary:      Effort: Pulmonary effort is normal.      Breath sounds: Normal breath sounds.      Comments: RA  Abdominal:      General: Bowel sounds are normal.   Genitourinary:     Comments: Denies dysuria   Musculoskeletal:      Cervical back: Neck supple.      Comments: WC  PT  Plus one BLE  edema    Skin:     General: Skin is warm and dry.   Neurological:      Mental Status: She is alert. Mental status is at baseline.   Psychiatric:         Mood and Affect: Mood normal.      Comments: Conversational                   Electronically Signed By: HALEIGH Mcgovern   8/2/24  9:16 AM

## 2024-08-02 VITALS
BODY MASS INDEX: 32.01 KG/M2 | SYSTOLIC BLOOD PRESSURE: 126 MMHG | TEMPERATURE: 98.2 F | DIASTOLIC BLOOD PRESSURE: 68 MMHG | WEIGHT: 175 LBS | RESPIRATION RATE: 15 BRPM | HEART RATE: 70 BPM | OXYGEN SATURATION: 95 %

## 2024-08-02 NOTE — PROGRESS NOTES
Patient ID: Noemi Pérez is a 73 y.o. female who is long term resident being seen and evaluated for multiple medical problems.  [id number]   1950    /68   Pulse 70   Temp 36.8 °C (98.2 °F)   Resp 15   Wt 79.4 kg (175 lb)   SpO2 95%   BMI 32.01 kg/m²     Assessment/Plan  Problem List Items Addressed This Visit       Chronic kidney disease     Monitor renal panel  Avoid NSAIDS  4/26/2023 BUN/Creat 16/1.57  BMP X 1         Constipation     Colace 100 mg by mouth BID  MiraLax 17 grams by mouth daily   MOM 30 ml by mouth daily as needed         Physical deconditioning     PT         Anemia     Ferrous sulfate 325 mg by mouth daily   B12 1000 mcg IM monthly   6/7/2024 H.H 13.5/40.3         Leg edema     10/31/2023 Dr Bailey (Cardiology) apt completed   Lasix 40 mg by mouth daily   NAT at [facility] 11/15/2023          Deep vein thrombosis (DVT) of right lower extremity (Multi) - Primary     PMH  Apixaban 5 mg by mouth BID   Dr Leigh vascular apt completed 11/20/2024  CBC with diff x 1              HPI: Client continues to receive PT services. Client denies HA, dizziness, or lightheadedness. Denies CP, SOB, Cough, or increased edema. RA. Denies abdominal pain, N/V, or diarrhea. C/O constipation. Denies dysuria. Denies change in appetite. Denies insomnia. C/O chronic bilateral knee pain.     Review of Systems ROS as described in HPI     Physical Exam  Constitutional:       Comments: Sitting in WC   HENT:      Head: Normocephalic.      Mouth/Throat:      Mouth: Mucous membranes are moist.   Cardiovascular:      Rate and Rhythm: Normal rate.   Pulmonary:      Effort: Pulmonary effort is normal.      Breath sounds: Normal breath sounds.      Comments: RA  Abdominal:      General: Bowel sounds are normal.   Genitourinary:     Comments: Denies dysuria   Musculoskeletal:      Cervical back: Neck supple.      Comments: WC  PT  Plus one BLE edema    Skin:     General: Skin is warm and dry.   Neurological:       Mental Status: She is alert. Mental status is at baseline.   Psychiatric:         Mood and Affect: Mood normal.      Comments: Conversational

## 2024-08-02 NOTE — ASSESSMENT & PLAN NOTE
10/31/2023 Dr Bailey (Cardiology) apt completed   Lasix 40 mg by mouth daily   NAT at [facility] 11/15/2023

## 2024-09-12 ENCOUNTER — NURSING HOME VISIT (OUTPATIENT)
Dept: POST ACUTE CARE | Facility: EXTERNAL LOCATION | Age: 74
End: 2024-09-12
Payer: MEDICARE

## 2024-09-12 DIAGNOSIS — I82.4Z1 DEEP VEIN THROMBOSIS (DVT) OF DISTAL VEIN OF RIGHT LOWER EXTREMITY, UNSPECIFIED CHRONICITY (MULTI): Primary | ICD-10-CM

## 2024-09-12 DIAGNOSIS — K59.00 CONSTIPATION, UNSPECIFIED CONSTIPATION TYPE: ICD-10-CM

## 2024-09-12 DIAGNOSIS — R60.0 LEG EDEMA: ICD-10-CM

## 2024-09-12 DIAGNOSIS — N18.9 CHRONIC KIDNEY DISEASE, UNSPECIFIED CKD STAGE: ICD-10-CM

## 2024-09-12 NOTE — LETTER
Patient: Noemi Pérez  : 1950    Encounter Date: 2024    Patient ID: Noemi Pérez is a 73 y.o. female who is long term resident being seen and evaluated for multiple medical problems.  [id number]   1950    /62   Pulse 68   Temp 36.7 °C (98 °F)   Resp 16   SpO2 97%     Assessment/Plan  Problem List Items Addressed This Visit       Chronic kidney disease     Monitor renal panel  Avoid NSAIDS  2023 BUN/Creat 16/1.57  2024 BUN/Creat 14/1.1, Potassium 4.0          Constipation     Colace 100 mg by mouth BID  MiraLax 17 grams by mouth daily   MOM 30 ml by mouth daily as needed  Fleets enema [place] every 72 hrs as needed          Leg edema     10/31/2023 Dr Bailey (Cardiology) apt completed   Lasix 40 mg by mouth daily   NAT at [facility] 11/15/2023   2024 Potassium 4.0          Deep vein thrombosis (DVT) of right lower extremity (Multi) - Primary     PMH  Apixaban 5 mg by mouth BID   Dr Leigh vascular   2024 H/H 14.              HPI: Client denies HA, dizziness, or lightheadedness. Denies CP, SOB, Cough, or increased edema. RA. Denies abdominal pain, N/V, or diarrhea. C/O intermittent episodes of constipation. Denies dysuria. Denies change in appetite. She C/O bilateral hip pain- more so when sitting in WC.     Review of Systems ROS as described in HPI     Physical Exam  Constitutional:       Comments: Sitting in bed    HENT:      Mouth/Throat:      Mouth: Mucous membranes are moist.   Cardiovascular:      Rate and Rhythm: Normal rate and regular rhythm.   Pulmonary:      Effort: Pulmonary effort is normal.      Breath sounds: Normal breath sounds.      Comments: RA  Abdominal:      General: Bowel sounds are normal.   Genitourinary:     Comments: Denies dysuria   Musculoskeletal:      Comments: Plus one BLE edema (Below knees)    Skin:     General: Skin is warm and dry.   Neurological:      Mental Status: She is alert. Mental status is at baseline.   Psychiatric:          Mood and Affect: Mood normal.      Comments: Conversational                    Electronically Signed By: HALEIGH Mcgovern   9/14/24  9:04 AM

## 2024-09-14 VITALS
RESPIRATION RATE: 16 BRPM | OXYGEN SATURATION: 97 % | TEMPERATURE: 98 F | DIASTOLIC BLOOD PRESSURE: 62 MMHG | HEART RATE: 68 BPM | SYSTOLIC BLOOD PRESSURE: 118 MMHG

## 2024-09-14 NOTE — ASSESSMENT & PLAN NOTE
Colace 100 mg by mouth BID  MiraLax 17 grams by mouth daily   MOM 30 ml by mouth daily as needed  Fleets enema [place] every 72 hrs as needed

## 2024-09-14 NOTE — PROGRESS NOTES
Patient ID: Noemi Pérez is a 73 y.o. female who is long term resident being seen and evaluated for multiple medical problems.  [id number]   1950    /62   Pulse 68   Temp 36.7 °C (98 °F)   Resp 16   SpO2 97%     Assessment/Plan  Problem List Items Addressed This Visit       Chronic kidney disease     Monitor renal panel  Avoid NSAIDS  4/26/2023 BUN/Creat 16/1.57  9/2/2024 BUN/Creat 14/1.1, Potassium 4.0          Constipation     Colace 100 mg by mouth BID  MiraLax 17 grams by mouth daily   MOM 30 ml by mouth daily as needed  Fleets enema [place] every 72 hrs as needed          Leg edema     10/31/2023 Dr Bailey (Cardiology) apt completed   Lasix 40 mg by mouth daily   NAT at [facility] 11/15/2023   9/2/2024 Potassium 4.0          Deep vein thrombosis (DVT) of right lower extremity (Multi) - Primary     PMH  Apixaban 5 mg by mouth BID   Dr Leigh vascular   9/2/2024 H/H 14.1/46              HPI: Client denies HA, dizziness, or lightheadedness. Denies CP, SOB, Cough, or increased edema. RA. Denies abdominal pain, N/V, or diarrhea. C/O intermittent episodes of constipation. Denies dysuria. Denies change in appetite. She C/O bilateral hip pain- more so when sitting in WC.     Review of Systems ROS as described in HPI     Physical Exam  Constitutional:       Comments: Sitting in bed    HENT:      Mouth/Throat:      Mouth: Mucous membranes are moist.   Cardiovascular:      Rate and Rhythm: Normal rate and regular rhythm.   Pulmonary:      Effort: Pulmonary effort is normal.      Breath sounds: Normal breath sounds.      Comments: RA  Abdominal:      General: Bowel sounds are normal.   Genitourinary:     Comments: Denies dysuria   Musculoskeletal:      Comments: Plus one BLE edema (Below knees)    Skin:     General: Skin is warm and dry.   Neurological:      Mental Status: She is alert. Mental status is at baseline.   Psychiatric:         Mood and Affect: Mood normal.      Comments: Conversational

## 2024-09-14 NOTE — ASSESSMENT & PLAN NOTE
10/31/2023 Dr Bailey (Cardiology) apt completed   Lasix 40 mg by mouth daily   NAT at [facility] 11/15/2023   9/2/2024 Potassium 4.0

## 2024-09-14 NOTE — ASSESSMENT & PLAN NOTE
Monitor renal panel  Avoid NSAIDS  4/26/2023 BUN/Creat 16/1.57  9/2/2024 BUN/Creat 14/1.1, Potassium 4.0

## 2024-10-22 ENCOUNTER — NURSING HOME VISIT (OUTPATIENT)
Dept: POST ACUTE CARE | Facility: EXTERNAL LOCATION | Age: 74
End: 2024-10-22
Payer: MEDICARE

## 2024-10-22 DIAGNOSIS — G62.9 NEUROPATHY: ICD-10-CM

## 2024-10-22 DIAGNOSIS — R12 HEART BURN: ICD-10-CM

## 2024-10-22 DIAGNOSIS — I82.4Z1 DEEP VEIN THROMBOSIS (DVT) OF DISTAL VEIN OF RIGHT LOWER EXTREMITY, UNSPECIFIED CHRONICITY (MULTI): Primary | ICD-10-CM

## 2024-10-22 DIAGNOSIS — M25.551 BILATERAL HIP PAIN: ICD-10-CM

## 2024-10-22 DIAGNOSIS — M25.552 BILATERAL HIP PAIN: ICD-10-CM

## 2024-10-22 PROCEDURE — 99308 SBSQ NF CARE LOW MDM 20: CPT | Performed by: NURSE PRACTITIONER

## 2024-10-22 NOTE — LETTER
Patient: Noemi Pérez  : 1950    Encounter Date: 10/22/2024    Patient ID: Noemi Pérez is a 73 y.o. female who is long term resident being seen and evaluated for multiple medical problems.  [id number]   1950    /74   Pulse 83   Temp 36.7 °C (98.1 °F)   Resp 15   Wt 84.8 kg (187 lb)   SpO2 95%   BMI 34.20 kg/m²     Assessment/Plan  Problem List Items Addressed This Visit       Neuropathy     Gabapentin 600 mg by mouth BID         Bilateral hip pain     Acetaminophen 650 mg by mouth every 6 hrs as needed   Tramadol 50 mg  by mouth BID as needed          Heart burn     Tums 500 mg two tablets by mouth every 6 hrs as needed          Deep vein thrombosis (DVT) of right lower extremity (Multi) - Primary     PMH  Apixaban 5 mg by mouth BID   Dr Leigh vascular   2024 H/H 14.              HPI: Client denies HA, dizziness, or lightheadedness. Denies CP, SOB, Cough, or increased edema. RA. Denies abdominal pain, N/V, or diarrhea. Denies dysuria.Denies change in appetite. She C/O chronic pain- Bilateral hips, knees, and feet.  Podiatry consult for nail care placed.     Review of Systems ROS as described in HPI     Physical Exam  Constitutional:       Comments: Sitting in bed    HENT:      Mouth/Throat:      Mouth: Mucous membranes are moist.   Cardiovascular:      Rate and Rhythm: Normal rate.   Pulmonary:      Effort: Pulmonary effort is normal.      Breath sounds: Normal breath sounds.      Comments: RA  Abdominal:      General: Bowel sounds are normal.   Genitourinary:     Comments: Denies dysuria   Musculoskeletal:      Comments: WC transfers   Plus one BLE edema    Skin:     General: Skin is warm and dry.   Neurological:      Mental Status: She is alert. Mental status is at baseline.   Psychiatric:         Mood and Affect: Mood normal.                   Electronically Signed By: HALEIGH Mcgovern   10/23/24 10:10 AM

## 2024-10-23 VITALS
TEMPERATURE: 98.1 F | RESPIRATION RATE: 15 BRPM | OXYGEN SATURATION: 95 % | WEIGHT: 187 LBS | HEART RATE: 83 BPM | SYSTOLIC BLOOD PRESSURE: 116 MMHG | DIASTOLIC BLOOD PRESSURE: 74 MMHG | BODY MASS INDEX: 34.2 KG/M2

## 2024-10-23 NOTE — PROGRESS NOTES
Patient ID: Noemi Pérez is a 73 y.o. female who is long term resident being seen and evaluated for multiple medical problems.  [id number]   1950    /74   Pulse 83   Temp 36.7 °C (98.1 °F)   Resp 15   Wt 84.8 kg (187 lb)   SpO2 95%   BMI 34.20 kg/m²     Assessment/Plan  Problem List Items Addressed This Visit       Neuropathy     Gabapentin 600 mg by mouth BID         Bilateral hip pain     Acetaminophen 650 mg by mouth every 6 hrs as needed   Tramadol 50 mg  by mouth BID as needed          Heart burn     Tums 500 mg two tablets by mouth every 6 hrs as needed          Deep vein thrombosis (DVT) of right lower extremity (Multi) - Primary     PMH  Apixaban 5 mg by mouth BID   Dr Leigh vascular   9/2/2024 H/H 14.1/46              HPI: Client denies HA, dizziness, or lightheadedness. Denies CP, SOB, Cough, or increased edema. RA. Denies abdominal pain, N/V, or diarrhea. Denies dysuria.Denies change in appetite. She C/O chronic pain- Bilateral hips, knees, and feet.  Podiatry consult for nail care placed.     Review of Systems ROS as described in HPI     Physical Exam  Constitutional:       Comments: Sitting in bed    HENT:      Mouth/Throat:      Mouth: Mucous membranes are moist.   Cardiovascular:      Rate and Rhythm: Normal rate.   Pulmonary:      Effort: Pulmonary effort is normal.      Breath sounds: Normal breath sounds.      Comments: RA  Abdominal:      General: Bowel sounds are normal.   Genitourinary:     Comments: Denies dysuria   Musculoskeletal:      Comments: WC transfers   Plus one BLE edema    Skin:     General: Skin is warm and dry.   Neurological:      Mental Status: She is alert. Mental status is at baseline.   Psychiatric:         Mood and Affect: Mood normal.

## 2024-12-19 ENCOUNTER — NURSING HOME VISIT (OUTPATIENT)
Dept: POST ACUTE CARE | Facility: EXTERNAL LOCATION | Age: 74
End: 2024-12-19
Payer: MEDICARE

## 2024-12-19 DIAGNOSIS — M16.0 OSTEOARTHRITIS OF BOTH HIPS, UNSPECIFIED OSTEOARTHRITIS TYPE: ICD-10-CM

## 2024-12-19 DIAGNOSIS — M62.838 MUSCLE SPASM: ICD-10-CM

## 2024-12-19 DIAGNOSIS — G62.9 NEUROPATHY: ICD-10-CM

## 2024-12-19 DIAGNOSIS — R60.0 LEG EDEMA: ICD-10-CM

## 2024-12-19 DIAGNOSIS — I82.4Z1 DEEP VEIN THROMBOSIS (DVT) OF DISTAL VEIN OF RIGHT LOWER EXTREMITY, UNSPECIFIED CHRONICITY (MULTI): Primary | ICD-10-CM

## 2024-12-19 DIAGNOSIS — K59.00 CONSTIPATION, UNSPECIFIED CONSTIPATION TYPE: ICD-10-CM

## 2024-12-19 PROCEDURE — 99308 SBSQ NF CARE LOW MDM 20: CPT | Performed by: NURSE PRACTITIONER

## 2024-12-19 NOTE — LETTER
Patient: Noemi Pérez  : 1950    Encounter Date: 2024    Patient ID: Noemi Pérez is a 74 y.o. female who is long term resident being seen and evaluated for multiple medical problems.  [id number]   1950    /73   Pulse 76   Temp 36.7 °C (98.1 °F)   Resp 16   Wt 85.7 kg (189 lb)   SpO2 97%   BMI 34.57 kg/m²     Assessment/Plan  Problem List Items Addressed This Visit       Osteoarthritis of both hips     Chronic bilateral Hip and knee pain   Tramadol  50 mg by mouth BID as needed (Does not want prescribed as scheduled)   Acetaminophen 650 mg by mouth every 8 hrs as needed              Neuropathy     Gabapentin 600 mg by mouth BID         Constipation     Colace 100 mg by mouth BID  MiraLax 17 grams by mouth daily   MOM 30 ml by mouth daily as needed  Fleets enema HI every 72 hrs as needed          Muscle spasm     BLE/Back  Baclofen 5 mg by mouth BID          Leg edema     10/31/2023 Dr Bailey (Cardiology) apt completed   Lasix 40 mg by mouth daily   NAT at [facility] 11/15/2023   2024 BUN/Creat 14/1.1, Potassium 4.0          Deep vein thrombosis (DVT) of right lower extremity (Multi) - Primary     PMH  Apixaban 5 mg by mouth BID   Dr Leigh vascular   2024 H/H 14.1/46                HPI: Client receives facility psychiatric services- Bipolar and Depression. Client denies HA, dizziness, or lightheadedness. Denies CP, SOB, or increased edema. C/O slight cough. RA. Denies abdominal pain, N/V, or diarrhea. C/O episodes of constipation. Denies dysuria. Denies change in appetite. She C/O chronic hip pain.     Review of Systems ROS as described in HPI     Physical Exam  Constitutional:       Comments: Sitting in bed    HENT:      Mouth/Throat:      Mouth: Mucous membranes are moist.   Cardiovascular:      Rate and Rhythm: Normal rate.   Pulmonary:      Effort: Pulmonary effort is normal.      Breath sounds: Normal breath sounds.      Comments: RA  Abdominal:      General: Bowel  sounds are normal.   Genitourinary:     Comments: Denies dysuria   Musculoskeletal:      Comments: WC transfers   Plus one BLE edema   Skin:     General: Skin is warm and dry.   Neurological:      Mental Status: She is alert. Mental status is at baseline.   Psychiatric:         Mood and Affect: Mood normal.                   Electronically Signed By: HALEIGH Mcgovern   12/21/24 11:31 AM

## 2024-12-21 VITALS
OXYGEN SATURATION: 97 % | SYSTOLIC BLOOD PRESSURE: 120 MMHG | HEART RATE: 76 BPM | WEIGHT: 189 LBS | DIASTOLIC BLOOD PRESSURE: 73 MMHG | BODY MASS INDEX: 34.57 KG/M2 | RESPIRATION RATE: 16 BRPM | TEMPERATURE: 98.1 F

## 2024-12-21 NOTE — ASSESSMENT & PLAN NOTE
Colace 100 mg by mouth BID  MiraLax 17 grams by mouth daily   MOM 30 ml by mouth daily as needed  Fleets enema CT every 72 hrs as needed

## 2024-12-21 NOTE — ASSESSMENT & PLAN NOTE
Chronic bilateral Hip and knee pain   Tramadol  50 mg by mouth BID as needed (Does not want prescribed as scheduled)   Acetaminophen 650 mg by mouth every 8 hrs as needed

## 2024-12-21 NOTE — PROGRESS NOTES
Patient ID: Noemi Pérez is a 74 y.o. female who is long term resident being seen and evaluated for multiple medical problems.  [id number]   1950    /73   Pulse 76   Temp 36.7 °C (98.1 °F)   Resp 16   Wt 85.7 kg (189 lb)   SpO2 97%   BMI 34.57 kg/m²     Assessment/Plan  Problem List Items Addressed This Visit       Osteoarthritis of both hips     Chronic bilateral Hip and knee pain   Tramadol  50 mg by mouth BID as needed (Does not want prescribed as scheduled)   Acetaminophen 650 mg by mouth every 8 hrs as needed              Neuropathy     Gabapentin 600 mg by mouth BID         Constipation     Colace 100 mg by mouth BID  MiraLax 17 grams by mouth daily   MOM 30 ml by mouth daily as needed  Fleets enema [place] every 72 hrs as needed          Muscle spasm     BLE/Back  Baclofen 5 mg by mouth BID          Leg edema     10/31/2023 Dr Bailey (Cardiology) apt completed   Lasix 40 mg by mouth daily   NAT at  [place] 11/15/2023   9/2/2024 BUN/Creat 14/1.1, Potassium 4.0          Deep vein thrombosis (DVT) of right lower extremity (Multi) - Primary     PMH  Apixaban 5 mg by mouth BID   Dr Leigh vascular   9/2/2024 H/H 14.1/46                HPI: Client receives facility psychiatric services- Bipolar and Depression. Client denies HA, dizziness, or lightheadedness. Denies CP, SOB, or increased edema. C/O slight cough. RA. Denies abdominal pain, N/V, or diarrhea. C/O episodes of constipation. Denies dysuria. Denies change in appetite. She C/O chronic hip pain.     Review of Systems ROS as described in HPI     Physical Exam  Constitutional:       Comments: Sitting in bed    HENT:      Mouth/Throat:      Mouth: Mucous membranes are moist.   Cardiovascular:      Rate and Rhythm: Normal rate.   Pulmonary:      Effort: Pulmonary effort is normal.      Breath sounds: Normal breath sounds.      Comments: RA  Abdominal:      General: Bowel sounds are normal.   Genitourinary:     Comments: Denies dysuria    Musculoskeletal:      Comments: WC transfers   Plus one BLE edema   Skin:     General: Skin is warm and dry.   Neurological:      Mental Status: She is alert. Mental status is at baseline.   Psychiatric:         Mood and Affect: Mood normal.

## 2024-12-21 NOTE — ASSESSMENT & PLAN NOTE
10/31/2023 Dr Bailey (Cardiology) apt completed   Lasix 40 mg by mouth daily   NAT at [facility] 11/15/2023   9/2/2024 BUN/Creat 14/1.1, Potassium 4.0

## 2025-01-23 ENCOUNTER — NURSING HOME VISIT (OUTPATIENT)
Dept: POST ACUTE CARE | Facility: EXTERNAL LOCATION | Age: 75
End: 2025-01-23
Payer: MEDICARE

## 2025-01-23 DIAGNOSIS — M16.0 OSTEOARTHRITIS OF BOTH HIPS, UNSPECIFIED OSTEOARTHRITIS TYPE: ICD-10-CM

## 2025-01-23 DIAGNOSIS — N18.31 CHRONIC KIDNEY DISEASE, STAGE 3A (MULTI): Primary | ICD-10-CM

## 2025-01-23 DIAGNOSIS — I50.32 CHRONIC DIASTOLIC (CONGESTIVE) HEART FAILURE: ICD-10-CM

## 2025-01-23 DIAGNOSIS — G62.9 NEUROPATHY: ICD-10-CM

## 2025-01-23 DIAGNOSIS — I82.4Z1 DEEP VEIN THROMBOSIS (DVT) OF DISTAL VEIN OF RIGHT LOWER EXTREMITY, UNSPECIFIED CHRONICITY (MULTI): ICD-10-CM

## 2025-01-23 DIAGNOSIS — K59.00 CONSTIPATION, UNSPECIFIED CONSTIPATION TYPE: ICD-10-CM

## 2025-01-23 PROCEDURE — 99308 SBSQ NF CARE LOW MDM 20: CPT | Performed by: NURSE PRACTITIONER

## 2025-01-23 NOTE — LETTER
Patient: Noemi Pérez  : 1950    Encounter Date: 2025    Patient ID: Noemi Pérez is a 74 y.o. female who is long term resident being seen and evaluated for multiple medical problems.  [id number]   1950    /73   Pulse 76   Temp 36.6 °C (97.8 °F)   Resp 15   Wt 89.8 kg (198 lb)   SpO2 97%   BMI 36.21 kg/m²     Assessment/Plan  Problem List Items Addressed This Visit       Osteoarthritis of both hips     Chronic bilateral Hip and knee pain   Tramadol  50 mg by mouth BID as needed (Does not want prescribed as scheduled)   Acetaminophen 650 mg by mouth every 8 hrs as needed            Neuropathy     Gabapentin 600 mg by mouth BID         Constipation     Colace 100 mg by mouth BID  MiraLax 17 grams by mouth daily   MOM 30 ml by mouth daily as needed  Fleets enema [place] every 72 hrs as needed          Deep vein thrombosis (DVT) of right lower extremity (Multi)     PMH  Apixaban 5 mg by mouth BID   Dr Leigh vascular   2024 H/H 14.1/46  2025 H/H 1236.2         Chronic diastolic (congestive) heart failure     10/31/2023 Dr Bailey (Cardiology)   Lasix 40 mg by mouth daily   NAT at [facility] 11/15/2023   2024 BUN/Creat 14/1.1, Potassium 4.0   2025 BUN/Creat 13/1.1, Potassium 4.0         Chronic kidney disease, stage 3a (Multi) - Primary     PMH  Monitor renal panel  Avoid NSAIDS  2023 BUN/Creat 16/1.57  2024 BUN/Creat 14/1.1, Potassium 4.0   2025 BUN/Creat 13/1.1, Potassium 4.0              HPI: Client denies HA, dizziness, or lightheadedness. Denies CP, SOB, or increased edema. RA. Denies abdominal pain, N/V, diarrhea, or constipation. Denies dysuria. Denies change in appetite. She has no current C/O pain.     Review of Systems Completed with staff input    Physical Exam  HENT:      Mouth/Throat:      Mouth: Mucous membranes are moist.   Cardiovascular:      Rate and Rhythm: Normal rate.   Pulmonary:      Effort: Pulmonary effort is normal.      Comments:  RA  Genitourinary:     Comments: Denies dysuria   Musculoskeletal:      Comments: WC transfers  pMH - chronic hip/knee pain    Skin:     General: Skin is warm and dry.   Neurological:      Mental Status: She is alert. Mental status is at baseline.   Psychiatric:         Mood and Affect: Mood normal.                   Electronically Signed By: HALEIGH Mcgovern   1/25/25 11:52 AM

## 2025-01-25 VITALS
OXYGEN SATURATION: 97 % | RESPIRATION RATE: 15 BRPM | DIASTOLIC BLOOD PRESSURE: 73 MMHG | HEART RATE: 76 BPM | WEIGHT: 198 LBS | BODY MASS INDEX: 36.21 KG/M2 | SYSTOLIC BLOOD PRESSURE: 120 MMHG | TEMPERATURE: 97.8 F

## 2025-01-25 PROBLEM — E66.01 MORBID (SEVERE) OBESITY DUE TO EXCESS CALORIES (MULTI): Status: ACTIVE | Noted: 2025-01-25

## 2025-01-25 PROBLEM — I50.32 CHRONIC DIASTOLIC (CONGESTIVE) HEART FAILURE: Status: ACTIVE | Noted: 2025-01-25

## 2025-01-25 PROBLEM — N18.31 CHRONIC KIDNEY DISEASE, STAGE 3A (MULTI): Status: ACTIVE | Noted: 2025-01-25

## 2025-01-25 NOTE — ASSESSMENT & PLAN NOTE
[facility]  Monitor renal panel  Avoid NSAIDS  4/26/2023 BUN/Creat 16/1.57  9/2/2024 BUN/Creat 14/1.1, Potassium 4.0   1/24/2025 BUN/Creat 13/1.1, Potassium 4.0

## 2025-01-25 NOTE — PROGRESS NOTES
Patient ID: Noemi Pérez is a 74 y.o. female who is long term resident being seen and evaluated for multiple medical problems.  [id number]   1950    /73   Pulse 76   Temp 36.6 °C (97.8 °F)   Resp 15   Wt 89.8 kg (198 lb)   SpO2 97%   BMI 36.21 kg/m²     Assessment/Plan  Problem List Items Addressed This Visit       Osteoarthritis of both hips     Chronic bilateral Hip and knee pain   Tramadol  50 mg by mouth BID as needed (Does not want prescribed as scheduled)   Acetaminophen 650 mg by mouth every 8 hrs as needed            Neuropathy     Gabapentin 600 mg by mouth BID         Constipation     Colace 100 mg by mouth BID  MiraLax 17 grams by mouth daily   MOM 30 ml by mouth daily as needed  Fleets enema [place] every 72 hrs as needed          Deep vein thrombosis (DVT) of right lower extremity (Multi)     PMH  Apixaban 5 mg by mouth BID   Dr Leigh vascular   9/2/2024 H/H 14.1/46  1/24/2025 H/H 12/36.2         Chronic diastolic (congestive) heart failure     10/31/2023 Dr Bailey (Cardiology)   Lasix 40 mg by mouth daily   NAT at [facility] 11/15/2023   9/2/2024 BUN/Creat 14/1.1, Potassium 4.0   1/24/2025 BUN/Creat 13/1.1, Potassium 4.0         Chronic kidney disease, stage 3a (Multi) - Primary     PMH  Monitor renal panel  Avoid NSAIDS  4/26/2023 BUN/Creat 16/1.57  9/2/2024 BUN/Creat 14/1.1, Potassium 4.0   1/24/2025 BUN/Creat 13/1.1, Potassium 4.0              HPI: Client denies HA, dizziness, or lightheadedness. Denies CP, SOB, or increased edema. RA. Denies abdominal pain, N/V, diarrhea, or constipation. Denies dysuria. Denies change in appetite. She has no current C/O pain.     Review of Systems Completed with staff input    Physical Exam  HENT:      Mouth/Throat:      Mouth: Mucous membranes are moist.   Cardiovascular:      Rate and Rhythm: Normal rate.   Pulmonary:      Effort: Pulmonary effort is normal.      Comments: RA  Genitourinary:     Comments: Denies dysuria   Musculoskeletal:       Comments: WC transfers  pMH - chronic hip/knee pain    Skin:     General: Skin is warm and dry.   Neurological:      Mental Status: She is alert. Mental status is at baseline.   Psychiatric:         Mood and Affect: Mood normal.

## 2025-01-25 NOTE — ASSESSMENT & PLAN NOTE
10/31/2023 Dr Bailey (Cardiology)   Lasix 40 mg by mouth daily   NAT at [facility] 11/15/2023   9/2/2024 BUN/Creat 14/1.1, Potassium 4.0   1/24/2025 BUN/Creat 13/1.1, Potassium 4.0

## 2025-01-25 NOTE — ASSESSMENT & PLAN NOTE
Colace 100 mg by mouth BID  MiraLax 17 grams by mouth daily   MOM 30 ml by mouth daily as needed  Fleets enema [place] every 72 hrs as needed

## 2025-02-27 ENCOUNTER — NURSING HOME VISIT (OUTPATIENT)
Dept: POST ACUTE CARE | Facility: EXTERNAL LOCATION | Age: 75
End: 2025-02-27
Payer: MEDICARE

## 2025-02-27 DIAGNOSIS — I50.32 CHRONIC DIASTOLIC (CONGESTIVE) HEART FAILURE: Primary | ICD-10-CM

## 2025-02-27 DIAGNOSIS — M25.551 BILATERAL HIP PAIN: ICD-10-CM

## 2025-02-27 DIAGNOSIS — I82.4Z1 DEEP VEIN THROMBOSIS (DVT) OF DISTAL VEIN OF RIGHT LOWER EXTREMITY, UNSPECIFIED CHRONICITY (MULTI): ICD-10-CM

## 2025-02-27 DIAGNOSIS — M25.552 BILATERAL HIP PAIN: ICD-10-CM

## 2025-02-27 DIAGNOSIS — R05.1 ACUTE COUGH: ICD-10-CM

## 2025-02-27 NOTE — LETTER
Patient: Noemi Pérez  : 1950    Encounter Date: 2025    Patient ID: Noemi Pérez is a 74 y.o. female who is long term resident being seen and evaluated for multiple medical problems.  [id number]   1950    /63   Pulse 63   Temp 36.3 °C (97.3 °F)   Resp 15   Wt 89.8 kg (198 lb)   SpO2 97%   BMI 36.21 kg/m²     Assessment/Plan  Problem List Items Addressed This Visit       Bilateral hip pain     Acetaminophen 650 mg by mouth every 6 hrs as needed   Tramadol 50 mg  by mouth BID as needed          Deep vein thrombosis (DVT) of right lower extremity (Multi)     PMH  Apixaban 5 mg by mouth BID   Dr Leigh vascular   2024 H/H 14.1/46  2025 H/H 1236.2         Chronic diastolic (congestive) heart failure - Primary     10/31/2023 Dr Bailey (Cardiology)   Lasix 40 mg by mouth daily   NAT at [facility] 11/15/2023   2024 BUN/Creat 14/1.1, Potassium 4.0   2025 BUN/Creat 13/1.1, Potassium 4.0         Acute cough     Cough and congestion   Afebrile   Mucinex 600 mg by mouth BID x 5 days               HPI: Client C/O cough and chest congestion- Mucinex. Staff to report fever/chills or worsening symptoms. Client denies HA, dizziness, or lightheadedness. Denies CP, SOB, or increased edema. RA. Denies abdominal pain, N/V, diarrhea, or constipation. Denies dysuria. Denies change in appetite. She states chronic hip/knee pain controlled by current pain medication regimen.     Review of Systems ROS as described in HPI     Physical Exam  Constitutional:       Comments: Sitting in bed eating breakfast    HENT:      Mouth/Throat:      Mouth: Mucous membranes are moist.   Cardiovascular:      Rate and Rhythm: Normal rate and regular rhythm.   Pulmonary:      Effort: Pulmonary effort is normal.      Breath sounds: Normal breath sounds.      Comments: RA  Abdominal:      General: Bowel sounds are normal.   Genitourinary:     Comments: Denies dysuria   Musculoskeletal:      Comments: Slight BLE  edema    Skin:     General: Skin is warm and dry.   Neurological:      Mental Status: She is alert. Mental status is at baseline.   Psychiatric:         Mood and Affect: Mood normal.                   Electronically Signed By: HALEIGH Mcgovern   3/1/25  1:11 PM

## 2025-03-01 VITALS
DIASTOLIC BLOOD PRESSURE: 63 MMHG | HEART RATE: 63 BPM | SYSTOLIC BLOOD PRESSURE: 102 MMHG | TEMPERATURE: 97.3 F | WEIGHT: 198 LBS | RESPIRATION RATE: 15 BRPM | BODY MASS INDEX: 36.21 KG/M2 | OXYGEN SATURATION: 97 %

## 2025-03-01 PROBLEM — R05.1 ACUTE COUGH: Status: ACTIVE | Noted: 2025-03-01

## 2025-03-01 NOTE — PROGRESS NOTES
Patient ID: Noemi Pérez is a 74 y.o. female who is long term resident being seen and evaluated for multiple medical problems.  [id number]   1950    /63   Pulse 63   Temp 36.3 °C (97.3 °F)   Resp 15   Wt 89.8 kg (198 lb)   SpO2 97%   BMI 36.21 kg/m²     Assessment/Plan  Problem List Items Addressed This Visit       Bilateral hip pain     Acetaminophen 650 mg by mouth every 6 hrs as needed   Tramadol 50 mg  by mouth BID as needed          Deep vein thrombosis (DVT) of right lower extremity (Multi)     PMH  Apixaban 5 mg by mouth BID   Dr Leigh vascular   9/2/2024 H/H 14.1/46  1/24/2025 H/H 12/36.2         Chronic diastolic (congestive) heart failure - Primary     10/31/2023 Dr Bailey (Cardiology)   Lasix 40 mg by mouth daily   NAT at [facility] 11/15/2023   9/2/2024 BUN/Creat 14/1.1, Potassium 4.0   1/24/2025 BUN/Creat 13/1.1, Potassium 4.0         Acute cough     Cough and congestion   Afebrile   Mucinex 600 mg by mouth BID x 5 days               HPI: Client C/O cough and chest congestion- Mucinex. Staff to report fever/chills or worsening symptoms. Client denies HA, dizziness, or lightheadedness. Denies CP, SOB, or increased edema. RA. Denies abdominal pain, N/V, diarrhea, or constipation. Denies dysuria. Denies change in appetite. She states chronic hip/knee pain controlled by current pain medication regimen.     Review of Systems ROS as described in HPI     Physical Exam  Constitutional:       Comments: Sitting in bed eating breakfast    HENT:      Mouth/Throat:      Mouth: Mucous membranes are moist.   Cardiovascular:      Rate and Rhythm: Normal rate and regular rhythm.   Pulmonary:      Effort: Pulmonary effort is normal.      Breath sounds: Normal breath sounds.      Comments: RA  Abdominal:      General: Bowel sounds are normal.   Genitourinary:     Comments: Denies dysuria   Musculoskeletal:      Comments: Slight BLE edema    Skin:     General: Skin is warm and dry.   Neurological:       Mental Status: She is alert. Mental status is at baseline.   Psychiatric:         Mood and Affect: Mood normal.

## 2025-03-01 NOTE — ASSESSMENT & PLAN NOTE
10/31/2023 Dr Bailey (Cardiology)   Lasix 40 mg by mouth daily   NAT at [facility] 11/15/2023   9/2/2024 BUN/Creat 14/1.1, Potassium 4.0   1/24/2025 BUN/Creat 13/1.1, Potassium 4.0

## 2025-04-03 ENCOUNTER — NURSING HOME VISIT (OUTPATIENT)
Dept: POST ACUTE CARE | Facility: EXTERNAL LOCATION | Age: 75
End: 2025-04-03
Payer: MEDICARE

## 2025-04-03 VITALS
BODY MASS INDEX: 35.85 KG/M2 | SYSTOLIC BLOOD PRESSURE: 110 MMHG | RESPIRATION RATE: 15 BRPM | HEART RATE: 77 BPM | TEMPERATURE: 97.3 F | WEIGHT: 196 LBS | DIASTOLIC BLOOD PRESSURE: 58 MMHG | OXYGEN SATURATION: 96 %

## 2025-04-03 DIAGNOSIS — I50.32 CHRONIC DIASTOLIC (CONGESTIVE) HEART FAILURE: Primary | ICD-10-CM

## 2025-04-03 DIAGNOSIS — F32.A DEPRESSION, UNSPECIFIED DEPRESSION TYPE: ICD-10-CM

## 2025-04-03 DIAGNOSIS — K59.00 CONSTIPATION, UNSPECIFIED CONSTIPATION TYPE: ICD-10-CM

## 2025-04-03 DIAGNOSIS — E66.01 MORBID (SEVERE) OBESITY DUE TO EXCESS CALORIES (MULTI): ICD-10-CM

## 2025-04-03 DIAGNOSIS — I82.4Z1 DEEP VEIN THROMBOSIS (DVT) OF DISTAL VEIN OF RIGHT LOWER EXTREMITY, UNSPECIFIED CHRONICITY: ICD-10-CM

## 2025-04-03 PROCEDURE — 99308 SBSQ NF CARE LOW MDM 20: CPT | Performed by: NURSE PRACTITIONER

## 2025-04-03 NOTE — ASSESSMENT & PLAN NOTE
Lexapro 10 mg by mouth daily   Trazodone 75 mg by mouth every at bedtime  Sertraline 100 mg by mouth daily  Psychiatry manages

## 2025-04-03 NOTE — ASSESSMENT & PLAN NOTE
PMH  Continue apixaban second to limited mobility  Apixaban 5 mg by mouth BID   Dr Leigh vascular   9/2/2024 H/H 14.1/46  1/24/2025 H/H 12/36.2

## 2025-04-03 NOTE — LETTER
Patient: Noemi Pérez  : 1950    Encounter Date: 2025    Patient ID: Noemi Pérez is a 74 y.o. female who is long term resident being seen and evaluated for multiple medical problems.  [id number]   1950    /58   Pulse 77   Temp 36.3 °C (97.3 °F)   Resp 15   Wt 88.9 kg (196 lb)   SpO2 96%   BMI 35.85 kg/m²     Assessment/Plan  Problem List Items Addressed This Visit       Depression     Lexapro 10 mg by mouth daily   Trazodone 75 mg by mouth every at bedtime  Sertraline 100 mg by mouth daily  Psychiatry manages             Constipation     Colace 100 mg by mouth BID  MiraLax 17 grams by mouth daily   MOM 30 ml by mouth daily as needed  Fleets enema [place] every 72 hrs as neede         Deep vein thrombosis (DVT) of right lower extremity     PMH  Continue apixaban second to limited mobility  Apixaban 5 mg by mouth BID   Dr Leigh vascular   2024 H/H 14.1/46  2025 H/H 1236.2         Chronic diastolic (congestive) heart failure - Primary     10/31/2023 Dr Bailey (Cardiology)   Lasix 40 mg by mouth daily   NAT at [facility] 11/15/2023   2024 BUN/Creat 14/1.1, Potassium 4.0   2025 BUN/Creat 13/1.1, Potassium 4.0         Morbid (severe) obesity due to excess calories (Multi)     Decrease caloric intake  Healthy food choices  Staff to encourage client to get out of bed into wheelchair daily              HPI: Client complains of intermittent episodes of constipation. Client denies HA, dizziness, or lightheadedness. Denies CP, SOB, Cough, or increased edema. Denies abdominal pain, N/V, or diarrhea. Denies dysuria or hematuria. Denies change in appetite.  She has no current complaints of pain.  Staff report client prefers to rest in bed.  Staff to encourage client to get out of bed daily into wheelchair.    Review of Systems ROS as described in HPI     Physical Exam  Constitutional:       Comments: Sitting in bed   HENT:      Mouth/Throat:      Mouth: Mucous membranes are moist.    Cardiovascular:      Rate and Rhythm: Normal rate and regular rhythm.   Pulmonary:      Effort: Pulmonary effort is normal.      Breath sounds: Normal breath sounds.      Comments: Room air  Abdominal:      General: Bowel sounds are normal.   Genitourinary:     Comments: Denies dysuria  Musculoskeletal:      Comments: +1 bilateral lower extremity edema  Bilateral foot drop  Wheelchair transfers   Skin:     General: Skin is warm and dry.   Neurological:      Mental Status: She is alert. Mental status is at baseline.   Psychiatric:         Mood and Affect: Mood normal.      Comments: Cooperative and follows directions                   Electronically Signed By: HALEIGH Mcgovern   4/3/25  2:30 PM

## 2025-04-03 NOTE — ASSESSMENT & PLAN NOTE
Colace 100 mg by mouth BID  MiraLax 17 grams by mouth daily   MOM 30 ml by mouth daily as needed  Fleets enema [place] every 72 hrs as neede

## 2025-04-03 NOTE — ASSESSMENT & PLAN NOTE
Decrease caloric intake  Healthy food choices  Staff to encourage client to get out of bed into wheelchair daily

## 2025-04-03 NOTE — ASSESSMENT & PLAN NOTE
10/31/2023 Dr Bailey (Cardiology)   Lasix 40 mg by mouth daily   NAT at [facility] 11/15/2023   9/2/2024 BUN/Creat 14/1.1, Potassium 4.0   1/24/2025 BUN/Creat 13/1.1, Potassium 4.0

## 2025-04-03 NOTE — PROGRESS NOTES
Patient ID: Noemi Pérez is a 74 y.o. female who is long term resident being seen and evaluated for multiple medical problems.  [id number]   1950    /58   Pulse 77   Temp 36.3 °C (97.3 °F)   Resp 15   Wt 88.9 kg (196 lb)   SpO2 96%   BMI 35.85 kg/m²     Assessment/Plan  Problem List Items Addressed This Visit       Depression     Lexapro 10 mg by mouth daily   Trazodone 75 mg by mouth every at bedtime  Sertraline 100 mg by mouth daily  Psychiatry manages             Constipation     Colace 100 mg by mouth BID  MiraLax 17 grams by mouth daily   MOM 30 ml by mouth daily as needed  Fleets enema [place] every 72 hrs as neede         Deep vein thrombosis (DVT) of right lower extremity     PMH  Continue apixaban second to limited mobility  Apixaban 5 mg by mouth BID   Dr Leigh vascular   9/2/2024 H/H 14.1/46  1/24/2025 H/H 12/36.2         Chronic diastolic (congestive) heart failure - Primary     10/31/2023 Dr Bailye (Cardiology)   Lasix 40 mg by mouth daily   NAT at [facility] 11/15/2023   9/2/2024 BUN/Creat 14/1.1, Potassium 4.0   1/24/2025 BUN/Creat 13/1.1, Potassium 4.0         Morbid (severe) obesity due to excess calories (Multi)     Decrease caloric intake  Healthy food choices  Staff to encourage client to get out of bed into wheelchair daily              HPI: Client complains of intermittent episodes of constipation. Client denies HA, dizziness, or lightheadedness. Denies CP, SOB, Cough, or increased edema. Denies abdominal pain, N/V, or diarrhea. Denies dysuria or hematuria. Denies change in appetite.  She has no current complaints of pain.  Staff report client prefers to rest in bed.  Staff to encourage client to get out of bed daily into wheelchair.    Review of Systems ROS as described in HPI     Physical Exam  Constitutional:       Comments: Sitting in bed   HENT:      Mouth/Throat:      Mouth: Mucous membranes are moist.   Cardiovascular:      Rate and Rhythm: Normal rate and regular rhythm.    Pulmonary:      Effort: Pulmonary effort is normal.      Breath sounds: Normal breath sounds.      Comments: Room air  Abdominal:      General: Bowel sounds are normal.   Genitourinary:     Comments: Denies dysuria  Musculoskeletal:      Comments: +1 bilateral lower extremity edema  Bilateral foot drop  Wheelchair transfers   Skin:     General: Skin is warm and dry.   Neurological:      Mental Status: She is alert. Mental status is at baseline.   Psychiatric:         Mood and Affect: Mood normal.      Comments: Cooperative and follows directions

## 2025-05-22 ENCOUNTER — NURSING HOME VISIT (OUTPATIENT)
Dept: POST ACUTE CARE | Facility: EXTERNAL LOCATION | Age: 75
End: 2025-05-22
Payer: MEDICARE

## 2025-05-22 DIAGNOSIS — K02.9 DENTAL DECAY: ICD-10-CM

## 2025-05-22 DIAGNOSIS — I82.4Z1 DEEP VEIN THROMBOSIS (DVT) OF DISTAL VEIN OF RIGHT LOWER EXTREMITY, UNSPECIFIED CHRONICITY: ICD-10-CM

## 2025-05-22 DIAGNOSIS — I50.32 CHRONIC DIASTOLIC (CONGESTIVE) HEART FAILURE: Primary | ICD-10-CM

## 2025-05-22 DIAGNOSIS — F31.9 BIPOLAR AFFECTIVE DISORDER, REMISSION STATUS UNSPECIFIED (MULTI): ICD-10-CM

## 2025-05-22 DIAGNOSIS — F32.A DEPRESSION, UNSPECIFIED DEPRESSION TYPE: ICD-10-CM

## 2025-05-22 NOTE — LETTER
Patient: Noemi Pérez  : 1950    Encounter Date: 2025    Patient ID: Noemi Pérez is a 74 y.o. female who is long term resident being seen and evaluated for multiple medical problems.  [id number]   1950    /71   Pulse 76   Temp 36.6 °C (97.9 °F)   Resp 15   Wt 90.3 kg (199 lb)   SpO2 90%   BMI 36.40 kg/m²     Assessment/Plan  Problem List Items Addressed This Visit       Depression    Lexapro 10 mg by mouth daily   Trazodone 75 mg by mouth every at bedtime  Sertraline 100 mg by mouth daily  Psychiatry manages         Bipolar disorder    PMH  Psychiatry manages         Deep vein thrombosis (DVT) of right lower extremity    PMH  Continue Apixaban second to limited mobility  Apixaban 5 mg by mouth BID   Dr Leigh vascular   2025 hemoglobin 12.8            Chronic diastolic (congestive) heart failure - Primary    10/31/2023 Dr Bailey (Cardiology)   Lasix 40 mg by mouth daily   NAT at [facility] 11/15/2023   2025 BUN/creatinine 16/1.0, potassium 3.9, hemoglobin 12.8         Dental decay    Dentist appointment              HPI: Client denies headache or dizziness.  She denies chest pain or shortness of breath.  No complaints of cough.  Room air.  She denies abdominal pain, nausea, vomiting, or diarrhea.  No change in appetite.  She denies dysuria.  She denies insomnia.  She has no current complaints of pain.  Dental decay-agrees to see facility dentist.    Note completed with [place].  Please excuse any grammatical errors.    Review of Systems ROS as described in HPI     Physical Exam  Constitutional:       Comments: Sitting in bed   HENT:      Mouth/Throat:      Mouth: Mucous membranes are moist.   Cardiovascular:      Rate and Rhythm: Normal rate.   Pulmonary:      Effort: Pulmonary effort is normal.      Breath sounds: Normal breath sounds.      Comments: Room air  Abdominal:      General: Bowel sounds are normal.   Genitourinary:     Comments: Denies dysuria  Musculoskeletal:       Comments: Slight bilateral ankle edema  Wheelchair transfers   Skin:     General: Skin is warm and dry.   Neurological:      Mental Status: She is alert. Mental status is at baseline.   Psychiatric:         Mood and Affect: Mood normal.      Comments: Conversational                   Electronically Signed By: HALEIGH Mcgovern   5/24/25  7:30 AM

## 2025-05-24 VITALS
HEART RATE: 76 BPM | WEIGHT: 199 LBS | TEMPERATURE: 97.9 F | SYSTOLIC BLOOD PRESSURE: 124 MMHG | OXYGEN SATURATION: 90 % | BODY MASS INDEX: 36.4 KG/M2 | DIASTOLIC BLOOD PRESSURE: 71 MMHG | RESPIRATION RATE: 15 BRPM

## 2025-05-24 PROBLEM — K02.9 DENTAL DECAY: Status: ACTIVE | Noted: 2025-05-24

## 2025-05-24 NOTE — ASSESSMENT & PLAN NOTE
10/31/2023 Dr Bailey (Cardiology)   Lasix 40 mg by mouth daily   NAT at [facility] 11/15/2023   5/7/2025 BUN/creatinine 16/1.0, potassium 3.9, hemoglobin 12.8

## 2025-05-24 NOTE — PROGRESS NOTES
Patient ID: Noemi Pérez is a 74 y.o. female who is long term resident being seen and evaluated for multiple medical problems.  [id number]   1950    /71   Pulse 76   Temp 36.6 °C (97.9 °F)   Resp 15   Wt 90.3 kg (199 lb)   SpO2 90%   BMI 36.40 kg/m²     Assessment/Plan  Problem List Items Addressed This Visit       Depression    Lexapro 10 mg by mouth daily   Trazodone 75 mg by mouth every at bedtime  Sertraline 100 mg by mouth daily  Psychiatry manages         Bipolar disorder    PMH  Psychiatry manages         Deep vein thrombosis (DVT) of right lower extremity    PMH  Continue Apixaban second to limited mobility  Apixaban 5 mg by mouth BID   Dr Leigh vascular   5/7/2025 hemoglobin 12.8            Chronic diastolic (congestive) heart failure - Primary    10/31/2023 Dr Bailey (Cardiology)   Lasix 40 mg by mouth daily   NAT at [facility] 11/15/2023   5/7/2025 BUN/creatinine 16/1.0, potassium 3.9, hemoglobin 12.8         Dental decay    Dentist appointment              HPI: Client denies headache or dizziness.  She denies chest pain or shortness of breath.  No complaints of cough.  Room air.  She denies abdominal pain, nausea, vomiting, or diarrhea.  No change in appetite.  She denies dysuria.  She denies insomnia.  She has no current complaints of pain.  Dental decay-agrees to see facility dentist.    Note completed with Alex.  Please excuse any grammatical errors.    Review of Systems ROS as described in HPI     Physical Exam  Constitutional:       Comments: Sitting in bed   HENT:      Mouth/Throat:      Mouth: Mucous membranes are moist.   Cardiovascular:      Rate and Rhythm: Normal rate.   Pulmonary:      Effort: Pulmonary effort is normal.      Breath sounds: Normal breath sounds.      Comments: Room air  Abdominal:      General: Bowel sounds are normal.   Genitourinary:     Comments: Denies dysuria  Musculoskeletal:      Comments: Slight bilateral ankle edema  Wheelchair transfers   Skin:      General: Skin is warm and dry.   Neurological:      Mental Status: She is alert. Mental status is at baseline.   Psychiatric:         Mood and Affect: Mood normal.      Comments: Conversational

## 2025-05-24 NOTE — ASSESSMENT & PLAN NOTE
PMH  Continue Apixaban second to limited mobility  Apixaban 5 mg by mouth BID   Dr Leigh vascular   5/7/2025 hemoglobin 12.8

## 2025-05-29 DIAGNOSIS — M25.552 BILATERAL HIP PAIN: Primary | ICD-10-CM

## 2025-05-29 DIAGNOSIS — M25.551 BILATERAL HIP PAIN: Primary | ICD-10-CM

## 2025-05-29 RX ORDER — TRAMADOL HYDROCHLORIDE 50 MG/1
50 TABLET, FILM COATED ORAL 2 TIMES DAILY PRN
Qty: 60 TABLET | Refills: 5 | Status: SHIPPED | OUTPATIENT
Start: 2025-05-29

## 2025-06-26 ENCOUNTER — NURSING HOME VISIT (OUTPATIENT)
Dept: POST ACUTE CARE | Facility: EXTERNAL LOCATION | Age: 75
End: 2025-06-26
Payer: MEDICARE

## 2025-06-26 DIAGNOSIS — K08.89 PAIN, DENTAL: Primary | ICD-10-CM

## 2025-06-26 NOTE — LETTER
Patient: Noemi Pérez  : 1950    Encounter Date: 2025    Patient ID: Noemi Pérez is a 74 y.o. female who is long term resident being seen and evaluated for multiple medical problems.  [id number]   1950    /72   Pulse 78   Temp 36.7 °C (98 °F)   Resp 15   Wt 90.7 kg (200 lb)   SpO2 97%   BMI 36.58 kg/m²     Assessment/Plan  Problem List Items Addressed This Visit       Pain, dental - Primary    Dental decay  Amoxicillin 500 mg by mouth every 8 hrs x 10 days   Probiotic as prescribed   Schedule a dentist apt              HPI: Client C/O dental pain. Dental decay. Client denies HA, dizziness, or lightheadedness. Denies CP, SOB, Cough, or increased edema. RA. Denies abdominal pain, N/V, diarrhea, or constipation. Denies dysuria. Denies change in appetite.     Review of Systems ROS as described in HPI     Physical Exam  Constitutional:       Comments: Sitting in bed    HENT:      Head: Normocephalic.      Comments: Dental decay     Mouth/Throat:      Mouth: Mucous membranes are moist.   Cardiovascular:      Rate and Rhythm: Normal rate.   Pulmonary:      Effort: Pulmonary effort is normal.      Breath sounds: Normal breath sounds.      Comments: RA  Abdominal:      General: Bowel sounds are normal.   Genitourinary:     Comments: Denies dysuria   Musculoskeletal:      Comments: WC transfers   Plus one BLE edema    Skin:     General: Skin is warm and dry.   Neurological:      Mental Status: She is alert. Mental status is at baseline.   Psychiatric:         Mood and Affect: Mood normal.      Comments: Cooperative with assessment                    Electronically Signed By: HALEIGH Mcgovern   25  7:44 AM

## 2025-06-28 VITALS
TEMPERATURE: 98 F | DIASTOLIC BLOOD PRESSURE: 72 MMHG | HEART RATE: 78 BPM | OXYGEN SATURATION: 97 % | WEIGHT: 200 LBS | BODY MASS INDEX: 36.58 KG/M2 | RESPIRATION RATE: 15 BRPM | SYSTOLIC BLOOD PRESSURE: 125 MMHG

## 2025-06-28 PROBLEM — K08.89 PAIN, DENTAL: Status: ACTIVE | Noted: 2025-06-28

## 2025-06-28 NOTE — ASSESSMENT & PLAN NOTE
Dental decay  Amoxicillin 500 mg by mouth every 8 hrs x 10 days   Probiotic as prescribed   Schedule a dentist apt

## 2025-06-28 NOTE — PROGRESS NOTES
Patient ID: Noemi Pérez is a 74 y.o. female who is long term resident being seen and evaluated for multiple medical problems.  [id number]   1950    /72   Pulse 78   Temp 36.7 °C (98 °F)   Resp 15   Wt 90.7 kg (200 lb)   SpO2 97%   BMI 36.58 kg/m²     Assessment/Plan  Problem List Items Addressed This Visit       Pain, dental - Primary    Dental decay  Amoxicillin 500 mg by mouth every 8 hrs x 10 days   Probiotic as prescribed   Schedule a dentist apt              HPI: Client C/O dental pain. Dental decay. Client denies HA, dizziness, or lightheadedness. Denies CP, SOB, Cough, or increased edema. RA. Denies abdominal pain, N/V, diarrhea, or constipation. Denies dysuria. Denies change in appetite.     Review of Systems ROS as described in HPI     Physical Exam  Constitutional:       Comments: Sitting in bed    HENT:      Head: Normocephalic.      Comments: Dental decay     Mouth/Throat:      Mouth: Mucous membranes are moist.   Cardiovascular:      Rate and Rhythm: Normal rate.   Pulmonary:      Effort: Pulmonary effort is normal.      Breath sounds: Normal breath sounds.      Comments: RA  Abdominal:      General: Bowel sounds are normal.   Genitourinary:     Comments: Denies dysuria   Musculoskeletal:      Comments: WC transfers   Plus one BLE edema    Skin:     General: Skin is warm and dry.   Neurological:      Mental Status: She is alert. Mental status is at baseline.   Psychiatric:         Mood and Affect: Mood normal.      Comments: Cooperative with assessment

## 2025-08-07 ENCOUNTER — NURSING HOME VISIT (OUTPATIENT)
Dept: POST ACUTE CARE | Facility: EXTERNAL LOCATION | Age: 75
End: 2025-08-07
Payer: MEDICARE

## 2025-08-07 DIAGNOSIS — I82.4Z1 DEEP VEIN THROMBOSIS (DVT) OF DISTAL VEIN OF RIGHT LOWER EXTREMITY, UNSPECIFIED CHRONICITY: ICD-10-CM

## 2025-08-07 DIAGNOSIS — K02.9 DENTAL DECAY: ICD-10-CM

## 2025-08-07 DIAGNOSIS — I50.32 CHRONIC DIASTOLIC (CONGESTIVE) HEART FAILURE: Primary | ICD-10-CM

## 2025-08-07 DIAGNOSIS — M16.0 OSTEOARTHRITIS OF BOTH HIPS, UNSPECIFIED OSTEOARTHRITIS TYPE: ICD-10-CM

## 2025-08-07 DIAGNOSIS — G62.9 NEUROPATHY: ICD-10-CM

## 2025-08-07 PROCEDURE — 99308 SBSQ NF CARE LOW MDM 20: CPT | Performed by: NURSE PRACTITIONER

## 2025-08-07 NOTE — LETTER
Patient: Noemi Pérez  : 1950    Encounter Date: 2025    Patient ID: Noemi Pérez is a 74 y.o. female who is long term resident being seen and evaluated for multiple medical problems.  [id number]   1950    /76   Pulse 79   Temp 36.8 °C (98.3 °F)   Resp 15   Wt 90.7 kg (200 lb)   SpO2 97%   BMI 36.58 kg/m²     Assessment/Plan  Problem List Items Addressed This Visit       Osteoarthritis of both hips    Chronic bilateral Hip and knee pain   Tramadol  50 mg by mouth BID as needed (Does not want prescribed as scheduled)   Acetaminophen 650 mg by mouth every 8 hrs as needed  Baclofen 5 mg by mouth twice daily         Neuropathy    Gabapentin 600 mg by mouth BID         Deep vein thrombosis (DVT) of right lower extremity    PMH  Continue Apixaban second to limited mobility  Apixaban 5 mg by mouth BID   Dr Leigh vascular   2025 hemoglobin 12.8            Chronic diastolic (congestive) heart failure - Primary    10/31/2023 Dr Bailey (Cardiology)   Lasix 40 mg by mouth daily   NAT at [facility] 11/15/2023   2025 BUN/creatinine 16/1.0, potassium 3.9, hemoglobin 12.8  2025 BUN/creatinine 15/1.1, potassium 3.8, hemoglobin 12.3         Dental decay    Dentist appointment at [facility] completed 2025  Nystatin swish and swallow as prescribed  Follow-up appointment for dental extractions pending              HPI: Client receiving nystatin swish and swallow which was prescribed at [facility] second to concern for mouth fungus.  She is pending a follow-up appointment at [facility] for dental extractions.  She denies headache or dizziness.  She denies chest pain or shortness of breath.  Room air.  She denies abdominal pain, nausea, vomiting, or diarrhea.  No change in appetite.  No significant weight change.  She denies dysuria.  She denies insomnia.  He complains of chronic bilateral hip and knee pain.    Note completed with Alex.  Please excuse any grammatical errors.    Review of Systems ROS as  described in HPI     Physical Exam  Constitutional:       Comments: Sitting in bed   HENT:      Mouth/Throat:      Mouth: Mucous membranes are moist.     Cardiovascular:      Rate and Rhythm: Normal rate.   Pulmonary:      Effort: Pulmonary effort is normal.      Breath sounds: Normal breath sounds.      Comments: Room air  Abdominal:      General: Bowel sounds are normal.   Genitourinary:     Comments: Denies dysuria    Musculoskeletal:      Comments: Wheelchair transfers  +1 bilateral lower extremity and foot edema     Skin:     General: Skin is warm and dry.     Neurological:      Mental Status: She is alert. Mental status is at baseline.     Psychiatric:         Mood and Affect: Mood normal.      Comments: Cooperative and conversational                   Electronically Signed By: HALEIGH Mcgovern   8/10/25  2:48 PM

## 2025-08-10 VITALS
WEIGHT: 200 LBS | RESPIRATION RATE: 15 BRPM | TEMPERATURE: 98.3 F | SYSTOLIC BLOOD PRESSURE: 122 MMHG | HEART RATE: 79 BPM | OXYGEN SATURATION: 97 % | BODY MASS INDEX: 36.58 KG/M2 | DIASTOLIC BLOOD PRESSURE: 76 MMHG

## 2025-08-10 NOTE — ASSESSMENT & PLAN NOTE
10/31/2023 Dr Bailey (Cardiology)   Lasix 40 mg by mouth daily   NAT at [facility] 11/15/2023   5/7/2025 BUN/creatinine 16/1.0, potassium 3.9, hemoglobin 12.8  8/6/2025 BUN/creatinine 15/1.1, potassium 3.8, hemoglobin 12.3

## 2025-08-10 NOTE — ASSESSMENT & PLAN NOTE
Chronic bilateral Hip and knee pain   Tramadol  50 mg by mouth BID as needed (Does not want prescribed as scheduled)   Acetaminophen 650 mg by mouth every 8 hrs as needed  Baclofen 5 mg by mouth twice daily

## 2025-08-10 NOTE — ASSESSMENT & PLAN NOTE
Dentist appointment at [facility] completed 7/29/2025  Nystatin swish and swallow as prescribed  Follow-up appointment for dental extractions pending

## 2025-08-10 NOTE — PROGRESS NOTES
Patient ID: Noemi Pérez is a 74 y.o. female who is long term resident being seen and evaluated for multiple medical problems.  [id number]   1950    /76   Pulse 79   Temp 36.8 °C (98.3 °F)   Resp 15   Wt 90.7 kg (200 lb)   SpO2 97%   BMI 36.58 kg/m²     Assessment/Plan  Problem List Items Addressed This Visit       Osteoarthritis of both hips    Chronic bilateral Hip and knee pain   Tramadol  50 mg by mouth BID as needed (Does not want prescribed as scheduled)   Acetaminophen 650 mg by mouth every 8 hrs as needed  Baclofen 5 mg by mouth twice daily         Neuropathy    Gabapentin 600 mg by mouth BID         Deep vein thrombosis (DVT) of right lower extremity    PMH  Continue Apixaban second to limited mobility  Apixaban 5 mg by mouth BID   Dr Leigh vascular   5/7/2025 hemoglobin 12.8            Chronic diastolic (congestive) heart failure - Primary    10/31/2023 Dr Bailey (Cardiology)   Lasix 40 mg by mouth daily   NAT at [facility] 11/15/2023   5/7/2025 BUN/creatinine 16/1.0, potassium 3.9, hemoglobin 12.8  8/6/2025 BUN/creatinine 15/1.1, potassium 3.8, hemoglobin 12.3         Dental decay    Dentist appointment at [facility] completed 7/29/2025  Nystatin swish and swallow as prescribed  Follow-up appointment for dental extractions pending              HPI: Client receiving nystatin swish and swallow which was prescribed at [facility] second to concern for mouth fungus.  She is pending a follow-up appointment at [facility] for dental extractions.  She denies headache or dizziness.  She denies chest pain or shortness of breath.  Room air.  She denies abdominal pain, nausea, vomiting, or diarrhea.  No change in appetite.  No significant weight change.  She denies dysuria.  She denies insomnia.  He complains of chronic bilateral hip and knee pain.    Note completed with Dragon.  Please excuse any grammatical errors.    Review of Systems ROS as described in HPI     Physical Exam  Constitutional:       Comments: Sitting  in bed   HENT:      Mouth/Throat:      Mouth: Mucous membranes are moist.     Cardiovascular:      Rate and Rhythm: Normal rate.   Pulmonary:      Effort: Pulmonary effort is normal.      Breath sounds: Normal breath sounds.      Comments: Room air  Abdominal:      General: Bowel sounds are normal.   Genitourinary:     Comments: Denies dysuria    Musculoskeletal:      Comments: Wheelchair transfers  +1 bilateral lower extremity and foot edema     Skin:     General: Skin is warm and dry.     Neurological:      Mental Status: She is alert. Mental status is at baseline.     Psychiatric:         Mood and Affect: Mood normal.      Comments: Cooperative and conversational